# Patient Record
Sex: FEMALE | URBAN - METROPOLITAN AREA
[De-identification: names, ages, dates, MRNs, and addresses within clinical notes are randomized per-mention and may not be internally consistent; named-entity substitution may affect disease eponyms.]

---

## 2017-05-31 ENCOUNTER — HISTORICAL (OUTPATIENT)
Dept: ADMINISTRATIVE | Facility: HOSPITAL | Age: 27
End: 2017-05-31

## 2017-05-31 LAB
ABS NEUT (OLG): 7.17 X10(3)/MCL (ref 2.1–9.2)
BASOPHILS # BLD AUTO: 0.02 X10(3)/MCL
BASOPHILS NFR BLD AUTO: 0 % (ref 0–1)
BILIRUB SERPL-MCNC: NEGATIVE MG/DL
BLOOD URINE, POC: NORMAL
BUN SERPL-MCNC: 6 MG/DL (ref 7–25)
CALCIUM SERPL-MCNC: 9.3 MG/DL (ref 8.4–10.3)
CHLORIDE SERPL-SCNC: 101 MMOL/L (ref 96–110)
CLARITY, POC UA: NORMAL
CO2 SERPL-SCNC: 23 MMOL/L (ref 24–32)
COLOR, POC UA: YELLOW
CREAT SERPL-MCNC: 0.4 MG/DL (ref 0.7–1.1)
EOSINOPHIL # BLD AUTO: 0.02 10*3/UL
EOSINOPHIL NFR BLD AUTO: 0 % (ref 0–5)
ERYTHROCYTE [DISTWIDTH] IN BLOOD BY AUTOMATED COUNT: 12.4 % (ref 11.5–14.5)
GLUCOSE SERPL-MCNC: 87 MG/DL (ref 65–99)
GLUCOSE UR QL STRIP: NEGATIVE
HBV SURFACE AG SERPL QL IA: NEGATIVE
HCT VFR BLD AUTO: 35.7 % (ref 35–46)
HCV AB SERPL QL IA: NONREACTIVE
HGB BLD-MCNC: 12.4 GM/DL (ref 12–16)
HIV 1+2 AB+HIV1 P24 AG SERPL QL IA: NONREACTIVE
IMM GRANULOCYTES # BLD AUTO: 0.05 10*3/UL
IMM GRANULOCYTES NFR BLD AUTO: 0 %
KETONES UR QL STRIP: NEGATIVE
LEUKOCYTE EST, POC UA: NORMAL
LYMPHOCYTES # BLD AUTO: 2.48 X10(3)/MCL
LYMPHOCYTES NFR BLD AUTO: 24 % (ref 15–40)
MCH RBC QN AUTO: 31.6 PG (ref 26–34)
MCHC RBC AUTO-ENTMCNC: 34.7 GM/DL (ref 31–37)
MCV RBC AUTO: 90.8 FL (ref 80–100)
MONOCYTES # BLD AUTO: 0.51 X10(3)/MCL
MONOCYTES NFR BLD AUTO: 5 % (ref 4–12)
NEUTROPHILS # BLD AUTO: 7.17 X10(3)/MCL
NEUTROPHILS NFR BLD AUTO: 70 X10(3)/MCL
NITRITE, POC UA: NEGATIVE
PH, POC UA: 6
PLATELET # BLD AUTO: 280 X10(3)/MCL (ref 130–400)
PMV BLD AUTO: 10.8 FL (ref 7.4–10.4)
POTASSIUM SERPL-SCNC: 3.8 MMOL/L (ref 3.6–5.2)
PROTEIN, POC: NORMAL
RBC # BLD AUTO: 3.93 X10(6)/MCL (ref 4–5.2)
RPR SER QL: NORMAL
SODIUM SERPL-SCNC: 134 MMOL/L (ref 135–146)
SPECIFIC GRAVITY, POC UA: 1.02
UROBILINOGEN, POC UA: NORMAL
WBC # SPEC AUTO: 10.2 X10(3)/MCL (ref 4.5–11)

## 2017-06-02 LAB — FINAL CULTURE: NORMAL

## 2022-04-10 ENCOUNTER — HISTORICAL (OUTPATIENT)
Dept: ADMINISTRATIVE | Facility: HOSPITAL | Age: 32
End: 2022-04-10

## 2022-04-27 VITALS
SYSTOLIC BLOOD PRESSURE: 116 MMHG | OXYGEN SATURATION: 99 % | BODY MASS INDEX: 33.01 KG/M2 | WEIGHT: 186.31 LBS | HEIGHT: 63 IN | DIASTOLIC BLOOD PRESSURE: 75 MMHG

## 2022-04-30 NOTE — PROGRESS NOTES
Patient:   Kaylin Larios             MRN: [id number]            FIN: [id number]               Age:   26 years     Sex:  Female     :  1990   Associated Diagnoses:   None   Author:   Marito Nicholson MD      Physician: WIN  Reason for Exam: INITIAL PRENATAL VISIT; DATING ULTRASOUND  : 1  Parity: 0  LMP: 2017  Gestational Age: 17w6d  EDC: 2017    Examination:  TERESA: ADEQUATE  Fetal Tone: PRESENT  Fetal Movement: PRESENT  Fetal Heart Rate: 146  Fetus: SINGLE  Presentation: TRANSVERSE  Placenta:       Position: POSTERIOR; FUNDAL      stGstrstastdstest:st st1st Measurement:  BPD: 2.90cm  [15w2d]  HC: 10.60cm  [15w0d]  AC: 8.82cn   [15w0d]  FL: 1.78cm   [15w2d]    EGA: 15w1d  EDC: 2017    Comments: 2nd trimester intrauterine pregnancy measuring 15w1d for EDC of 2017 which differs from menstrual date of 2017 by two weeks. FHR: 146. Viable intrauterine pregnancy.  ASSIGN EDC: 2017

## 2022-04-30 NOTE — PROGRESS NOTES
Patient:   Kaylin Larios             MRN: [id number]            FIN: [id number]               Age:   26 years     Sex:  Female     :  1990   Associated Diagnoses:   None   Author:   Tomeka Simms MD      History of Present Illness   26 year old  female @ 15w1d EGA with EDC of 17 and confirmed by 2nd trimester ultrasound presents to [facility] [place] for routine prenatal visit.    Chronic Issue and Treatment: none    Acute complaints: no complaints, compliant with PNVs    OB Review of Systems  Fetal movements: sometimes  Vaginal bleeding: denies  Vaginal discharge: denies  Loss of fluid: denies  Contractions: denies  Headaches: reports, resolve with tylenol  Vision changes: denies  Edema: denies    Pregnancy History:   - G1: current  GYN History: 13yrs at menarche, regular cycles [id number]; LMP 2016, no h/o abnormal Paps, no h/o STDs  Medical History: reviewed  Surgical History: reviewed  Social History: Denies tobacco/drugs/alcohol  Medications: PNVs         Review of Systems   Constitutional:  Negative, No fever, No chills, No fatigue, No night sweats.    Ear/Nose/Mouth/Throat:  Negative, No nasal congestion, No sore throat.    Respiratory:  Negative, No shortness of breath, No cough, No wheezing.    Cardiovascular:  Negative, No chest pain.    Gastrointestinal:  Negative, No nausea, No vomiting, No diarrhea, No constipation.    Genitourinary:  Negative.    Musculoskeletal:  Negative, No joint pain, No muscle pain.    Integumentary:  Negative, No rash.    Neurologic:  Negative.    Psychiatric:  Negative.       Health Status   Allergies:    Allergic Reactions (Selected)  Severity Not Documented  No Known Allergies- No reactions were documented.,    Allergies (1) Active Reaction  No Known Allergies None Documented     Current medications:    No qualifying data available     Problem list:    No qualifying data available        Histories   Past Medical History:    No active or resolved past medical history  items have been selected or recorded.   Family History:    No family history items have been selected or recorded.   Procedure history:    No active procedure history items have been selected or recorded.   Social History        Social & Psychosocial Habits    No Data Available  .        Physical Examination   Vital Signs   5/31/2017 8:24 CDT       Temperature Oral          36.8 DegC                             Peripheral Pulse Rate     57 bpm  LOW                             Respiratory Rate          18 br/min                             SpO2                      99 %                             Systolic Blood Pressure   116 mmHg                             Diastolic Blood Pressure  75 mmHg     General:  Alert and oriented, No acute distress.    Respiratory:  Lungs are clear to auscultation, Respirations are non-labored, Breath sounds are equal, Symmetrical chest wall expansion.    Cardiovascular:  Normal rate, Regular rhythm, No murmur, Good pulses equal in all extremities, No edema.    Gastrointestinal:  Soft, Non-tender, Non-distended, Normal bowel sounds, gravid.    Obstetric Exam     Shahid/ Baby A fetal evaluation: heart tones (within normal limits (110 to 160 bpm), evaluated on US ).     normal appearing external genitalia with no visible lesions; moderate thin white vaginal discharge; speculum exam: cervix clearly visualized, appears smooth with no visible lesions; vaginal mucosa pink, no lacerations; bimanual exam: adnexa nontender without evidence of masses.     Integumentary:  Warm, Dry.    Neurologic:  Alert, Oriented, No focal deficits.    Cognition and Speech:  Oriented.    Psychiatric:  Cooperative, Appropriate mood & affect, Normal judgment.       Health Maintenance      Health Maintenance     Pending (in the next year)        Due            Alcohol Misuse Screening due  05/31/17  and every 1  year(s)           Body Mass Index Check due  05/31/17  and every 1  year(s)           Cervical Cancer  Screening due  05/31/17  Variable frequency           Depression Screening due  05/31/17  and every 1  year(s)           HIV Screening due  05/31/17  and every 1  year(s)           Obesity Screening due  05/31/17  and every 1  year(s)           Tetanus Vaccine due  05/31/17  and every 10  year(s)        Due In Future            Influenza Vaccine not due until  10/02/17  and every 1  year(s)           Blood Pressure Screening not due until  04/10/18  and every 1  year(s)     Satisfied (in the past 1 year)        Satisfied            Blood Pressure Screening on  04/10/17.  Satisfied by Aron MOCK, Karine PALACIOS          Review / Management     Ultrasound   1st Trimester US Date/Location: 5/31/17   Impression: 2nd trimester intrauterine pregnancy measuring 15w1d for EDC of 11/21/2017 which differs from menstrual date of 11/02/2017 by two weeks. FHR: 146. Viable intrauterine pregnancy.  ASSIGN EDC: 11/21/2017     US for Fetal Anomalies Date/Location: _    _ [facility] at time of US   Impression: _    Inital OB Labs (ordered today)  Blood Type and Rh: _  Antibody Screen: _  CBC H/H: _  HIV: _  RPR: _  GC: _  CT: _  HBsAg: _  Rubella: _  Varicella: _  UA & Culture: _  HCVAb: _  Sickle Cell Screen: _  PAP: _  Influenza vaccine date: _  15-20 Weeks Lab  Quad Screen: _  28 Week Lab  1H GTT: _  Date of Rhogam if indicated: _  Date of Tdap: _  36 Week Lab  GBS Culture: _        Results review:  All Results   5/31/2017 8:42 CDT       Urine Color Urine Dipstick                Yellow                             Urine Appearance Urine Dipstick           Slightly cloudy                             pH Urine Dipstick         6                             Specific Gravity Urine Dipstick           1.025                             Blood Urine Dipstick      Small                             Glucose Urine Dipstick    Negative                             Ketones Urine Dipstick    Negative                             Protein Urine Dipstick    Trace                              Bilirubin Urine Dipstick  Negative                             Urobilinogen Urine Dipstick               0.2 mg/dl                             Leukocytes Urine Dipstick Small                             Nitrite Urine Dipstick    Negative  .       Impression and Plan   Diagnosis   26 year old  female @ 15w1d EGA:    1. IUP   - continue PNV   - routine lab orders    - urine dip above, trace protein   - plans to try breast feeding     - discussed postpartum birthcontrol, patient will consider her options   - ER/WIC precautions    Assign to Resident, Return to Clinic 4 Weeks

## 2025-05-21 ENCOUNTER — HOSPITAL ENCOUNTER (EMERGENCY)
Facility: HOSPITAL | Age: 35
Discharge: ANOTHER HOSPITAL | End: 2025-05-21
Attending: EMERGENCY MEDICINE

## 2025-05-21 ENCOUNTER — HOSPITAL ENCOUNTER (INPATIENT)
Facility: HOSPITAL | Age: 35
LOS: 2 days | Discharge: HOME | DRG: 101 | End: 2025-05-23
Attending: EMERGENCY MEDICINE | Admitting: STUDENT IN AN ORGANIZED HEALTH CARE EDUCATION/TRAINING PROGRAM

## 2025-05-21 VITALS
OXYGEN SATURATION: 99 % | WEIGHT: 183 LBS | HEART RATE: 82 BPM | TEMPERATURE: 98 F | DIASTOLIC BLOOD PRESSURE: 80 MMHG | RESPIRATION RATE: 20 BRPM | BODY MASS INDEX: 32.43 KG/M2 | SYSTOLIC BLOOD PRESSURE: 118 MMHG | HEIGHT: 63 IN

## 2025-05-21 DIAGNOSIS — R41.82 ALTERED MENTAL STATE: ICD-10-CM

## 2025-05-21 DIAGNOSIS — R51.9 ACUTE NONINTRACTABLE HEADACHE, UNSPECIFIED HEADACHE TYPE: ICD-10-CM

## 2025-05-21 DIAGNOSIS — R56.9 NEW ONSET SEIZURE: ICD-10-CM

## 2025-05-21 DIAGNOSIS — I49.9 CARDIAC RHYTHM DISORDER OR DISTURBANCE OR CHANGE: ICD-10-CM

## 2025-05-21 DIAGNOSIS — R07.9 CHEST PAIN: ICD-10-CM

## 2025-05-21 LAB
ACCEPTIBLE SP GR UR QL: >=1.03 (ref 1–1.03)
ALBUMIN SERPL-MCNC: 4.1 G/DL (ref 3.5–5)
ALBUMIN/GLOB SERPL: 1 RATIO (ref 1.1–2)
ALP SERPL-CCNC: 77 UNIT/L (ref 40–150)
ALT SERPL-CCNC: 19 UNIT/L (ref 0–55)
AMPHET UR QL SCN: NEGATIVE
ANION GAP SERPL CALC-SCNC: 12 MEQ/L
APPEARANCE CSF: CLEAR
AST SERPL-CCNC: 22 UNIT/L (ref 11–45)
BACTERIA #/AREA URNS AUTO: ABNORMAL /HPF
BARBITURATE SCN PRESENT UR: NEGATIVE
BASOPHILS # BLD AUTO: 0.03 X10(3)/MCL
BASOPHILS NFR BLD AUTO: 0.2 %
BENZODIAZ UR QL SCN: NEGATIVE
BILIRUB SERPL-MCNC: 0.3 MG/DL
BILIRUB UR QL STRIP.AUTO: NEGATIVE
BUN SERPL-MCNC: 14.1 MG/DL (ref 7–18.7)
CALCIUM SERPL-MCNC: 9.8 MG/DL (ref 8.4–10.2)
CANNABINOIDS UR QL SCN: NEGATIVE
CHLORIDE SERPL-SCNC: 102 MMOL/L (ref 98–107)
CLARITY UR: CLEAR
CO2 SERPL-SCNC: 23 MMOL/L (ref 22–29)
COCAINE UR QL SCN: NEGATIVE
COLOR CSF: COLORLESS
COLOR UR AUTO: ABNORMAL
CREAT SERPL-MCNC: 0.66 MG/DL (ref 0.55–1.02)
CREAT/UREA NIT SERPL: 21
EOSINOPHIL # BLD AUTO: 0.07 X10(3)/MCL (ref 0–0.9)
EOSINOPHIL NFR BLD AUTO: 0.5 %
ERYTHROCYTE [DISTWIDTH] IN BLOOD BY AUTOMATED COUNT: 12 % (ref 11.5–17)
FENTANYL UR QL SCN: NEGATIVE
GFR SERPLBLD CREATININE-BSD FMLA CKD-EPI: >60 ML/MIN/1.73/M2
GLOBULIN SER-MCNC: 4.2 GM/DL (ref 2.4–3.5)
GLUCOSE CSF-MCNC: 78 MG/DL (ref 40–70)
GLUCOSE SERPL-MCNC: 121 MG/DL (ref 74–100)
GLUCOSE UR QL STRIP: NEGATIVE
HCT VFR BLD AUTO: 42.6 % (ref 37–47)
HGB BLD-MCNC: 15.2 G/DL (ref 12–16)
HGB UR QL STRIP: ABNORMAL
IMM GRANULOCYTES # BLD AUTO: 0.04 X10(3)/MCL (ref 0–0.04)
IMM GRANULOCYTES NFR BLD AUTO: 0.3 %
KETONES UR QL STRIP: NEGATIVE
LACTATE SERPL-SCNC: 3.9 MMOL/L (ref 0.5–2.2)
LACTATE SERPL-SCNC: 5.3 MMOL/L (ref 0.5–2.2)
LEUKOCYTE ESTERASE UR QL STRIP: NEGATIVE
LYMPHOCYTE MAN % CSF (OLG): 95 %
LYMPHOCYTES # BLD AUTO: 3.92 X10(3)/MCL (ref 0.6–4.6)
LYMPHOCYTES NFR BLD AUTO: 26.2 %
MAGNESIUM SERPL-MCNC: 1.9 MG/DL (ref 1.6–2.6)
MCH RBC QN AUTO: 31.9 PG (ref 27–31)
MCHC RBC AUTO-ENTMCNC: 35.7 G/DL (ref 33–36)
MCV RBC AUTO: 89.3 FL (ref 80–94)
MONOCYTE MAN % CSF (OLG): 4 %
MONOCYTES # BLD AUTO: 0.7 X10(3)/MCL (ref 0.1–1.3)
MONOCYTES NFR BLD AUTO: 4.7 %
NEUTROPHILS # BLD AUTO: 10.21 X10(3)/MCL (ref 2.1–9.2)
NEUTROPHILS MAN % CSF (OLG): 1 %
NEUTROPHILS NFR BLD AUTO: 68.1 %
NITRITE UR QL STRIP: NEGATIVE
OPIATES UR QL SCN: NEGATIVE
PCP UR QL: NEGATIVE
PH UR STRIP: 5.5 [PH]
PH UR: 5.5 [PH] (ref 3–11)
PLATELET # BLD AUTO: 344 X10(3)/MCL (ref 130–400)
PMV BLD AUTO: 10.2 FL (ref 7.4–10.4)
POTASSIUM SERPL-SCNC: 3.5 MMOL/L (ref 3.5–5.1)
PROT CSF-MCNC: 19.6 MG/DL (ref 15–45)
PROT SERPL-MCNC: 8.3 GM/DL (ref 6.4–8.3)
PROT UR QL STRIP: 100
RBC # BLD AUTO: 4.77 X10(6)/MCL (ref 4.2–5.4)
RBC # CSF MANUAL: 0 /UL
RBC #/AREA URNS AUTO: ABNORMAL /HPF
SODIUM SERPL-SCNC: 137 MMOL/L (ref 136–145)
SP GR UR STRIP.AUTO: >=1.03 (ref 1–1.03)
SQUAMOUS #/AREA URNS AUTO: ABNORMAL /HPF
TNC CSF (OLG): 4 /UL
TROPONIN I SERPL-MCNC: <0.01 NG/ML (ref 0–0.04)
TUBE NUMBER CSF (BEAKER): 3
UROBILINOGEN UR STRIP-ACNC: 0.2
WBC # BLD AUTO: 14.97 X10(3)/MCL (ref 4.5–11.5)
WBC #/AREA URNS AUTO: ABNORMAL /HPF

## 2025-05-21 PROCEDURE — 80307 DRUG TEST PRSMV CHEM ANLYZR: CPT | Performed by: EMERGENCY MEDICINE

## 2025-05-21 PROCEDURE — 87798 DETECT AGENT NOS DNA AMP: CPT | Performed by: EMERGENCY MEDICINE

## 2025-05-21 PROCEDURE — 83735 ASSAY OF MAGNESIUM: CPT | Performed by: EMERGENCY MEDICINE

## 2025-05-21 PROCEDURE — 89051 BODY FLUID CELL COUNT: CPT | Performed by: EMERGENCY MEDICINE

## 2025-05-21 PROCEDURE — 11000001 HC ACUTE MED/SURG PRIVATE ROOM

## 2025-05-21 PROCEDURE — 84484 ASSAY OF TROPONIN QUANT: CPT | Performed by: EMERGENCY MEDICINE

## 2025-05-21 PROCEDURE — 96375 TX/PRO/DX INJ NEW DRUG ADDON: CPT

## 2025-05-21 PROCEDURE — 63600175 PHARM REV CODE 636 W HCPCS: Performed by: STUDENT IN AN ORGANIZED HEALTH CARE EDUCATION/TRAINING PROGRAM

## 2025-05-21 PROCEDURE — 63600175 PHARM REV CODE 636 W HCPCS: Performed by: EMERGENCY MEDICINE

## 2025-05-21 PROCEDURE — 96361 HYDRATE IV INFUSION ADD-ON: CPT

## 2025-05-21 PROCEDURE — 96365 THER/PROPH/DIAG IV INF INIT: CPT

## 2025-05-21 PROCEDURE — 25000003 PHARM REV CODE 250: Performed by: STUDENT IN AN ORGANIZED HEALTH CARE EDUCATION/TRAINING PROGRAM

## 2025-05-21 PROCEDURE — 93010 ELECTROCARDIOGRAM REPORT: CPT | Mod: ,,, | Performed by: INTERNAL MEDICINE

## 2025-05-21 PROCEDURE — 83605 ASSAY OF LACTIC ACID: CPT | Performed by: EMERGENCY MEDICINE

## 2025-05-21 PROCEDURE — 80053 COMPREHEN METABOLIC PANEL: CPT | Performed by: EMERGENCY MEDICINE

## 2025-05-21 PROCEDURE — 62270 DX LMBR SPI PNXR: CPT

## 2025-05-21 PROCEDURE — 81003 URINALYSIS AUTO W/O SCOPE: CPT | Performed by: EMERGENCY MEDICINE

## 2025-05-21 PROCEDURE — 87040 BLOOD CULTURE FOR BACTERIA: CPT | Performed by: EMERGENCY MEDICINE

## 2025-05-21 PROCEDURE — 82945 GLUCOSE OTHER FLUID: CPT | Performed by: EMERGENCY MEDICINE

## 2025-05-21 PROCEDURE — 85025 COMPLETE CBC W/AUTO DIFF WBC: CPT | Performed by: EMERGENCY MEDICINE

## 2025-05-21 PROCEDURE — 87070 CULTURE OTHR SPECIMN AEROBIC: CPT | Performed by: EMERGENCY MEDICINE

## 2025-05-21 PROCEDURE — 21400001 HC TELEMETRY ROOM

## 2025-05-21 PROCEDURE — 84157 ASSAY OF PROTEIN OTHER: CPT | Performed by: EMERGENCY MEDICINE

## 2025-05-21 PROCEDURE — 93005 ELECTROCARDIOGRAM TRACING: CPT

## 2025-05-21 PROCEDURE — 25000003 PHARM REV CODE 250: Performed by: EMERGENCY MEDICINE

## 2025-05-21 PROCEDURE — 99285 EMERGENCY DEPT VISIT HI MDM: CPT | Mod: 25,27

## 2025-05-21 RX ORDER — SODIUM CHLORIDE 9 MG/ML
INJECTION, SOLUTION INTRAVENOUS CONTINUOUS
Status: DISCONTINUED | OUTPATIENT
Start: 2025-05-21 | End: 2025-05-22

## 2025-05-21 RX ORDER — ONDANSETRON HYDROCHLORIDE 2 MG/ML
4 INJECTION, SOLUTION INTRAVENOUS EVERY 8 HOURS PRN
Status: DISCONTINUED | OUTPATIENT
Start: 2025-05-21 | End: 2025-05-23 | Stop reason: HOSPADM

## 2025-05-21 RX ORDER — MORPHINE SULFATE 4 MG/ML
2 INJECTION, SOLUTION INTRAMUSCULAR; INTRAVENOUS EVERY 6 HOURS PRN
Refills: 0 | Status: DISCONTINUED | OUTPATIENT
Start: 2025-05-21 | End: 2025-05-23 | Stop reason: HOSPADM

## 2025-05-21 RX ORDER — LIDOCAINE HYDROCHLORIDE 10 MG/ML
10 INJECTION, SOLUTION INFILTRATION; PERINEURAL
Status: COMPLETED | OUTPATIENT
Start: 2025-05-21 | End: 2025-05-21

## 2025-05-21 RX ORDER — TALC
9 POWDER (GRAM) TOPICAL NIGHTLY PRN
Status: DISCONTINUED | OUTPATIENT
Start: 2025-05-21 | End: 2025-05-23 | Stop reason: HOSPADM

## 2025-05-21 RX ORDER — ONDANSETRON 4 MG/1
8 TABLET, ORALLY DISINTEGRATING ORAL EVERY 8 HOURS PRN
Status: DISCONTINUED | OUTPATIENT
Start: 2025-05-21 | End: 2025-05-23 | Stop reason: HOSPADM

## 2025-05-21 RX ORDER — SIMETHICONE 80 MG
1 TABLET,CHEWABLE ORAL 4 TIMES DAILY PRN
Status: DISCONTINUED | OUTPATIENT
Start: 2025-05-21 | End: 2025-05-23 | Stop reason: HOSPADM

## 2025-05-21 RX ORDER — LEVETIRACETAM 10 MG/ML
1000 INJECTION INTRAVASCULAR
Status: COMPLETED | OUTPATIENT
Start: 2025-05-21 | End: 2025-05-21

## 2025-05-21 RX ORDER — GLUCAGON 1 MG
1 KIT INJECTION
Status: DISCONTINUED | OUTPATIENT
Start: 2025-05-21 | End: 2025-05-23 | Stop reason: HOSPADM

## 2025-05-21 RX ORDER — HYDROCODONE BITARTRATE AND ACETAMINOPHEN 5; 325 MG/1; MG/1
1 TABLET ORAL EVERY 6 HOURS PRN
Refills: 0 | Status: DISCONTINUED | OUTPATIENT
Start: 2025-05-21 | End: 2025-05-23 | Stop reason: HOSPADM

## 2025-05-21 RX ORDER — SODIUM CHLORIDE 0.9 % (FLUSH) 0.9 %
10 SYRINGE (ML) INJECTION
Status: DISCONTINUED | OUTPATIENT
Start: 2025-05-21 | End: 2025-05-23 | Stop reason: HOSPADM

## 2025-05-21 RX ORDER — IBUPROFEN 200 MG
24 TABLET ORAL
Status: DISCONTINUED | OUTPATIENT
Start: 2025-05-21 | End: 2025-05-23 | Stop reason: HOSPADM

## 2025-05-21 RX ORDER — POLYETHYLENE GLYCOL 3350 17 G/17G
17 POWDER, FOR SOLUTION ORAL 3 TIMES DAILY PRN
Status: DISCONTINUED | OUTPATIENT
Start: 2025-05-21 | End: 2025-05-23 | Stop reason: HOSPADM

## 2025-05-21 RX ORDER — CEFTRIAXONE 2 G/1
2 INJECTION, POWDER, FOR SOLUTION INTRAMUSCULAR; INTRAVENOUS
Status: COMPLETED | OUTPATIENT
Start: 2025-05-21 | End: 2025-05-21

## 2025-05-21 RX ORDER — IBUPROFEN 200 MG
16 TABLET ORAL
Status: DISCONTINUED | OUTPATIENT
Start: 2025-05-21 | End: 2025-05-23 | Stop reason: HOSPADM

## 2025-05-21 RX ORDER — LORAZEPAM 2 MG/ML
1 INJECTION INTRAMUSCULAR
Status: COMPLETED | OUTPATIENT
Start: 2025-05-21 | End: 2025-05-21

## 2025-05-21 RX ORDER — LORAZEPAM 2 MG/ML
2 INJECTION INTRAMUSCULAR
Status: DISCONTINUED | OUTPATIENT
Start: 2025-05-21 | End: 2025-05-23 | Stop reason: HOSPADM

## 2025-05-21 RX ORDER — ACETAMINOPHEN 325 MG/1
650 TABLET ORAL EVERY 4 HOURS PRN
Status: DISCONTINUED | OUTPATIENT
Start: 2025-05-21 | End: 2025-05-23 | Stop reason: HOSPADM

## 2025-05-21 RX ORDER — ONDANSETRON HYDROCHLORIDE 2 MG/ML
4 INJECTION, SOLUTION INTRAVENOUS
Status: COMPLETED | OUTPATIENT
Start: 2025-05-21 | End: 2025-05-21

## 2025-05-21 RX ORDER — ENOXAPARIN SODIUM 100 MG/ML
40 INJECTION SUBCUTANEOUS EVERY 24 HOURS
Status: DISCONTINUED | OUTPATIENT
Start: 2025-05-21 | End: 2025-05-23 | Stop reason: HOSPADM

## 2025-05-21 RX ORDER — BISACODYL 10 MG/1
10 SUPPOSITORY RECTAL DAILY PRN
Status: DISCONTINUED | OUTPATIENT
Start: 2025-05-21 | End: 2025-05-23 | Stop reason: HOSPADM

## 2025-05-21 RX ORDER — IPRATROPIUM BROMIDE AND ALBUTEROL SULFATE 2.5; .5 MG/3ML; MG/3ML
3 SOLUTION RESPIRATORY (INHALATION) EVERY 6 HOURS PRN
Status: DISCONTINUED | OUTPATIENT
Start: 2025-05-21 | End: 2025-05-23 | Stop reason: HOSPADM

## 2025-05-21 RX ORDER — DEXAMETHASONE SODIUM PHOSPHATE 4 MG/ML
12 INJECTION, SOLUTION INTRA-ARTICULAR; INTRALESIONAL; INTRAMUSCULAR; INTRAVENOUS; SOFT TISSUE
Status: COMPLETED | OUTPATIENT
Start: 2025-05-21 | End: 2025-05-21

## 2025-05-21 RX ORDER — NALOXONE HCL 0.4 MG/ML
0.02 VIAL (ML) INJECTION
Status: DISCONTINUED | OUTPATIENT
Start: 2025-05-21 | End: 2025-05-23 | Stop reason: HOSPADM

## 2025-05-21 RX ORDER — ALUMINUM HYDROXIDE, MAGNESIUM HYDROXIDE, AND SIMETHICONE 1200; 120; 1200 MG/30ML; MG/30ML; MG/30ML
30 SUSPENSION ORAL 4 TIMES DAILY PRN
Status: DISCONTINUED | OUTPATIENT
Start: 2025-05-21 | End: 2025-05-23 | Stop reason: HOSPADM

## 2025-05-21 RX ADMIN — ONDANSETRON 4 MG: 2 INJECTION INTRAMUSCULAR; INTRAVENOUS at 02:05

## 2025-05-21 RX ADMIN — HYDROCODONE BITARTRATE AND ACETAMINOPHEN 1 TABLET: 5; 325 TABLET ORAL at 11:05

## 2025-05-21 RX ADMIN — SODIUM CHLORIDE: 9 INJECTION, SOLUTION INTRAVENOUS at 10:05

## 2025-05-21 RX ADMIN — DEXAMETHASONE SODIUM PHOSPHATE 12 MG: 4 INJECTION, SOLUTION INTRA-ARTICULAR; INTRALESIONAL; INTRAMUSCULAR; INTRAVENOUS; SOFT TISSUE at 03:05

## 2025-05-21 RX ADMIN — VANCOMYCIN HYDROCHLORIDE 2000 MG: 1 INJECTION, POWDER, LYOPHILIZED, FOR SOLUTION INTRAVENOUS at 03:05

## 2025-05-21 RX ADMIN — ENOXAPARIN SODIUM 40 MG: 40 INJECTION SUBCUTANEOUS at 10:05

## 2025-05-21 RX ADMIN — CEFTRIAXONE SODIUM 2 G: 2 INJECTION, POWDER, FOR SOLUTION INTRAMUSCULAR; INTRAVENOUS at 02:05

## 2025-05-21 RX ADMIN — LORAZEPAM 1 MG: 2 INJECTION INTRAMUSCULAR; INTRAVENOUS at 02:05

## 2025-05-21 RX ADMIN — LEVETIRACETAM INJECTION 1000 MG: 10 INJECTION INTRAVENOUS at 09:05

## 2025-05-21 RX ADMIN — SODIUM CHLORIDE 1000 ML: 9 INJECTION, SOLUTION INTRAVENOUS at 02:05

## 2025-05-21 RX ADMIN — SODIUM CHLORIDE 1000 ML: 9 INJECTION, SOLUTION INTRAVENOUS at 10:05

## 2025-05-21 RX ADMIN — LIDOCAINE HYDROCHLORIDE 5 ML: 10 INJECTION, SOLUTION INFILTRATION; PERINEURAL at 03:05

## 2025-05-21 NOTE — ED NOTES
Acyclovir has to be made at [facility] and sent here.  Per transfer center, justin will be here within an hour.  Pharmacist unsure how long it will take to get here.  Dr. Lozano notified, okay to have pharmacy to keep there and give it when they get to the er.  Mikayla was notified in the ER there.

## 2025-05-21 NOTE — ED PROVIDER NOTES
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 The patient, a [place] national on a work assignment, experienced two distinct episodes of neurological dysfunction.    The initial episode occurred spontaneously while working, characterized by sudden loss of consciousness. According to her , the patient exhibited characteristic seizure  activity including ocular deviation and muscular rigidity. The episode lasted approximately two minutes, accompanied by disoriented verbalization.    Following a brief period of lucidity, a second more pronounced episode emerged  in occurred during transport to the hospital while on the ambulance, manifesting increased agitation and reduced responsiveness. The patient demonstrated heightened muscular tension and limited command comprehension. This episode was also less than 5 minutes duration   And was witnessed by 1st responders    The patient reported an antecedent headache of unusual presentation, which began the previous morning. She initially attributed the symptoms to potential cardiovascular  assuming her blood pressure was elevated or psychological etiology  considering that she was anxious or working too hard, but remained functional until the neurological event. No recent traumatic history or surgical interventions were reported. A significant language barrier exists, necessitating  services  for comprehensive communication.      upon arrival to the emergency room she is alert spontaneously with a GCS of 14 with 1 off for verbal response.  She does move all extremities and follows commands.  We have not witnessed any seizure-like activity in the emergency room.  She does complain of a severe headache describing as the worst of her life.      Review of patient's allergies indicates:  No Known Allergies  No past medical history on file.  No past surgical history on file.  No family history on file.  Social History[1]  Review of Systems   Constitutional:  Negative for chills and fever.   HENT: Negative.  Negative for congestion, sore throat and trouble swallowing.    Eyes:  Negative for discharge and visual disturbance.   Respiratory:  Negative for cough and shortness of breath.    Cardiovascular:  Positive for palpitations. Negative for chest pain.   Gastrointestinal:  Negative for abdominal pain, diarrhea and vomiting.   Endocrine: Negative.    Genitourinary:  Negative for flank pain and hematuria.   Musculoskeletal:  Negative for myalgias.   Skin:  Negative for rash.   Neurological:  Positive for seizures, speech difficulty and headaches. Negative for dizziness.   Psychiatric/Behavioral:  Positive for agitation and confusion. Negative for hallucinations and suicidal ideas.    All other systems reviewed and are negative.      Physical Exam     Initial Vitals [05/21/25 1341]   BP Pulse Resp Temp SpO2   125/73 (!) 130 20 97.9 °F (36.6 °C) 96 %      MAP       --         Physical Exam    Nursing note and vitals reviewed.  Constitutional: She appears well-developed and well-nourished. She is diaphoretic. She appears ill. She appears distressed.   HENT:   Head: Normocephalic and atraumatic.   Eyes: EOM are normal. Pupils are equal, round, and reactive to light.   Neck: Trachea normal. Neck supple.    Full passive range of motion without pain.     Cardiovascular:  Normal rate, regular rhythm and normal pulses.    "        Pulmonary/Chest: Breath sounds normal.   Abdominal: Abdomen is soft. Bowel sounds are normal.   Musculoskeletal:      Cervical back: Full passive range of motion without pain and neck supple.      Comments: No deformity, Nl ROM     Lymphadenopathy:     She has no cervical adenopathy.   Neurological: She is alert and oriented to person, place, and time. She has normal strength. GCS eye subscore is 4. GCS verbal subscore is 5. GCS motor subscore is 6.   Skin: Skin is warm and intact. Capillary refill takes less than 2 seconds.   Psychiatric: She is not actively hallucinating. She expresses no homicidal and no suicidal ideation.         ED Course   Lumbar Puncture    Date/Time: 5/21/2025 3:00 PM  Location procedure was performed: [facility] EMERGENCY DEPARTMENT    Performed by: Dominguez Lozano MD  Authorized by: Dominguez Lozano MD  Comments: Performed by:  Dr. Lozano  Consent: Written consent obtained.  Risks and benefits: risks, benefits and alternatives were discussed.  The procedure was explained to the patient/surrogate with an opportunity to ask questions, and the risks, benefits, and alternatives were discussed.  The potential complications, including post-LP headache, localized pain (during, and afterwards), infection (epidural abscess, discitis, etc), and bleeding (hematoma) were also discussed.  Consent given by: patient or patient representative/guardian  Patient understanding: patient states understanding of the procedure being performed  Patient consent: the patient's understanding of the procedure matches consent given  Procedure consent: procedure consent matches procedure scheduled  Site marked: the insertion site was marked  Patient identity confirmed: arm band and verbally with patient  Time out: Immediately prior to procedure a "time out" was called to verify the correct patient, procedure, equipment, support staff and site/side marked as required.  Anesthesia: local infiltration with lidocaine 1% " without epinephrine after skin prep with betadine  Anesthetic total: 4 ml  Patient sedated: no  Preparation: Patient was prepped and draped in the usual sterile fashion.  Lumbar space: L3 - L4 interspace  Patient's position: Upright (resting on tray)  Needle gauge: 22g, 3.5inch needle  Number of attempts: 2, atraumatic  Fluid appearance: clear  Tubes of fluid: 4  Total volume: 4 ml  Post-procedure: site cleaned and pressure dressing applied  Patient tolerance: Patient tolerated the procedure well with no immediate complications.            Critical Care    Date/Time: 5/21/2025 4:35 PM    Performed by: Dominguez Lozano MD  Authorized by: Dominguez Lozano MD  Total critical care time (exclusive of procedural time) : 0 minutes  Comments: Total critical care time is 75 minutes.      Total critical care time documented does not include time spent on separately billed procedures or the services of nurses, or mid-level providers.      I personally saw and examined the patient. I have reviewed all diagnostic interpretations and treatment plans as written. I was present for the key portions of any procedures performed and the inclusive time noted in any critical care statement. Critical care time includes patient management by me, time spent at the patient's bedside, time to review lab and imaging results, discussing patient care, documentation in the medical record, and time spent with the family or caregiver.     The high probability of sudden, clinically significant deterioration in the patient's condition required the highest level of preparedness to intervene urgently.          Labs Reviewed   COMPREHENSIVE METABOLIC PANEL - Abnormal       Result Value    Sodium 137      Potassium 3.5      Chloride 102      CO2 23      Glucose 121 (*)     Blood Urea Nitrogen 14.1      Creatinine 0.66      Calcium 9.8      Protein Total 8.3      Albumin 4.1      Globulin 4.2 (*)     Albumin/Globulin Ratio 1.0 (*)     Bilirubin Total 0.3       ALP 77      ALT 19      AST 22      eGFR >60      Anion Gap 12.0      BUN/Creatinine Ratio 21     LACTIC ACID, PLASMA - Abnormal    Lactic Acid Level 5.3 (*)    URINALYSIS, REFLEX TO URINE CULTURE - Abnormal    Color, UA Straw      Appearance, UA Clear      Specific Gravity, UA >=1.030      pH, UA 5.5      Protein,  (*)     Glucose, UA Negative      Ketones, UA Negative      Blood, UA Moderate (*)     Bilirubin, UA Negative      Urobilinogen, UA 0.2      Nitrites, UA Negative      Leukocyte Esterase, UA Negative     CBC WITH DIFFERENTIAL - Abnormal    WBC 14.97 (*)     RBC 4.77      Hgb 15.2      Hct 42.6      MCV 89.3      MCH 31.9 (*)     MCHC 35.7      RDW 12.0      Platelet 344      MPV 10.2      Neut % 68.1      Lymph % 26.2      Mono % 4.7      Eos % 0.5      Basophil % 0.2      Imm Grans % 0.3      Neut # 10.21 (*)     Lymph # 3.92      Mono # 0.70      Eos # 0.07      Baso # 0.03      Imm Gran # 0.04     LACTIC ACID, PLASMA - Abnormal    Lactic Acid Level 3.9 (*)    GLUCOSE, CSF - Abnormal    Glucose CSF  78 (*)    URINALYSIS, MICROSCOPIC - Abnormal    Bacteria, UA Few (*)     RBC, UA None Seen      WBC, UA 0-5      Squamous Epithelial Cells, UA Occasional     DRUG SCREEN, URINE (BEAKER) - Normal    Amphetamines, Urine Negative      Barbiturates, Urine Negative      Benzodiazepine, Urine Negative      Cannabinoids, Urine Negative      Cocaine, Urine Negative      Opiates, Urine Negative      Phencyclidine, Urine Negative      Fentanyl, Urine Negative      pH, Urine 5.5      Specific Gravity, Urine Auto >=1.030      Narrative:     Cut off concentrations:    Amphetamines - 1000 ng/ml  Barbiturates - 200 ng/ml  Benzodiazepine - 200 ng/ml  Cannabinoids (THC) - 50 ng/ml  Cocaine - 300 ng/ml  Fentanyl - 1.0 ng/ml  MDMA - 500 ng/ml  Opiates - 300 ng/ml   Phencyclidine (PCP) - 25 ng/ml    Specimen submitted for drug analysis and tested for pH and specific gravity in order to evaluate sample integrity.  Suspect tampering if specific gravity is <1.003 and/or pH is not within the range of 4.5 - 8.0  False negatives may result form substances such as bleach added to urine.  False positives may result for the presence of a substance with similar chemical structure to the drug or its metabolite.    This test provides only a PRELIMINARY analytical test result. A more specific alternate chemical method must be used in order to obtain a confirmed analytical result. Gas chromatography/mass spectrometry (GC/MS) is the preferred confirmatory method. Other chemical confirmation methods are available. Clinical consideration and professional judgement should be applied to any drug of abuse test result, particularly when preliminary positive results are used.    Positive results will be confirmed only at the physicians request. Unconfirmed screening results are to be used only for medical purposes (treatment).        MAGNESIUM - Normal    Magnesium Level 1.90     TROPONIN I - Normal    Troponin-I <0.010     PROTEIN, CSF - Normal    Protein CSF  19.6     BLOOD CULTURE [facility]   BLOOD CULTURE [facility]   BODY FLUID CULTURE [facility]   CBC W/ AUTO DIFFERENTIAL    Narrative:     The following orders were created for panel order CBC auto differential.  Procedure                               Abnormality         Status                     ---------                               -----------         ------                     CBC with Differential[[id number]]       Abnormal            Final result                 Please view results for these tests on the individual orders.   CELL COUNT, CSF    Tube # CSF 3      Color CSF Colorless      Appear CSF Clear      Total Nucleated Cells CSF 4      RBC CSF 0     FREEZE AND HOLD -    WEST NILE VIRUS, PCR, CSF   CELL COUNT W/ DIFF, CSF    Narrative:     The following orders were created for panel order Cell Count w/ Diff, CSF.  Procedure                               Abnormality         Status                      ---------                               -----------         ------                     Cell Count, CSF[[id number]]                                 Final result               Differential, CSF[[id number]]                               In process                   Please view results for these tests on the individual orders.   DIFFERENTIAL, CSF     EKG Readings: (Independently Interpreted)   Rhythm: Sinus Tachycardia. Ectopy: No Ectopy. Conduction: Normal. ST Segments: Normal ST Segments. T Waves: Normal. Clinical Impression: Normal Sinus Rhythm   Rate 103       Imaging Results              CT Head Without Contrast (Final result)  Result time 05/21/25 13:59:17      Final result by Ash Andre MD (05/21/25 13:59:17)                   Impression:      No acute intracranial findings.      Electronically signed by: Ash Andre  Date:    05/21/2025  Time:    13:59               Narrative:    EXAMINATION:  CT HEAD WITHOUT CONTRAST    CLINICAL HISTORY:  Headache, sudden, severe;    TECHNIQUE:  CT imaging of the head performed from the skull base to the vertex without intravenous contrast.  DLP 1105 mGycm. Automatic exposure control, adjustment of mA/kV or iterative reconstruction technique was used to reduce radiation.    COMPARISON:  None Available.    FINDINGS:  There is no acute cortical infarct, hemorrhage or mass lesion.  The ventricles are normal in size.    Visualized paranasal sinuses and mastoid air cells are clear.                                       Medications   acyclovir 700 mg in 0.9% NaCl 100 mL IVPB (has no administration in time range)   sodium chloride 0.9% bolus 1,000 mL 1,000 mL (0 mLs Intravenous Stopped 5/21/25 1503)   ondansetron injection 4 mg (4 mg Intravenous Given 5/21/25 1404)   LORazepam injection 1 mg (1 mg Intravenous Given 5/21/25 1405)   cefTRIAXone injection 2 g (2 g Intravenous Given 5/21/25 1454)   vancomycin (VANCOCIN) 2,000 mg in 0.9% NaCl 500 mL IVPB (0 mg Intravenous Stopped  5/21/25 1719)   dexAMETHasone injection 12 mg (12 mg Intravenous Given 5/21/25 1500)   LIDOcaine HCL 10 mg/ml (1%) injection 10 mL (5 mLs Infiltration Given by Provider 5/21/25 1500)     Medical Decision Making  Medical Decision Making  Comprehensive neurological evaluation of a 34-year-old female presenting with acute mental status alteration and paroxysmal neurological events. The clinical presentation mandates an urgent and targeted diagnostic approach with subarachnoid hemorrhage as the primary concern.    Neurological Assessment: The patient's acute-onset altered mental status, characterized by two distinct neurological episodes involving loss of consciousness, muscular rigidity, and transient cognitive disruption, requires immediate and focused investigation.    Detailed Differential Diagnosis:  - Subarachnoid hemorrhage is the primary diagnostic consideration, given the patient's report of the 'worst headache of her life' combined with new-onset seizure activity. Rapid radiographic and clinical exclusion of this potentially life-threatening condition is paramount.  - If subarachnoid hemorrhage is ruled out, a comprehensive evaluation for central nervous system infection becomes the next critical diagnostic pathway, necessitating a lumbar puncture to definitively exclude infectious etiology.  - Complex partial seizure disorder with atypical presentation remains a potential underlying mechanism.  - Metabolic encephalopathy potentially related to an underlying systemic condition could explain the patient's neurological symptoms.  - Toxic or environmental exposure with neurological sequelae will be considered in the diagnostic workup.    Comprehensive Management Strategy:  - Advanced neurological imaging with CT head   - Multi-panel extensive laboratory diagnostics   - Lumbar puncture to rule out CNS infection if subarachnoid hemorrhage is excluded  - Continuous cardiac and neurological monitoring with frequent  reassessment  - Detailed neurological status surveillance with precise documentation of progression or resolution of symptoms    Prognostic Considerations:  Close monitoring and a systematic, evidence-based approach will be critical in determining the underlying etiology and developing a targeted therapeutic intervention.    Amount and/or Complexity of Data Reviewed  Labs: ordered.  Radiology: ordered.    Risk  Prescription drug management.                     4:00 p.m.  Seizure with Suspected CNS Infection  Assessment: Patient presented with seizure activity and is now experiencing moderate head pain. CT scan was normal with no evidence of intracranial hemorrhage. No additional seizure activity has been observed since initial presentation. Laboratory results show an elevated white blood cell count over 15,000 and elevated lactic acid. Given these findings, CNS infection is the top differential diagnosis and requires immediate attention. Her condition remains critical until a definitive diagnosis is established.    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    Plan:  - Administer Decadron 12 mg IV (0.15 mg/kg dosing)  - Administer vancomycin 2 g IV loading dose  - Administer Rocephin 2 g IV  - Obtain blood cultures  - Perform lumbar puncture at bedside  - Transfer patient after successful completion of lumbar puncture  - Obtain neurological evaluation  - Consider workplace exposure investigation  - Closely monitor vital signs and neurological status  - Prepare for potential intensive care management if bacterial meningitis should be established           Patient was accepted to [facility] back to [place].  The acyclovir ordered has to be mixed at [facility] and shift here in order to  be given.  This is not efficient as he may be already EN route to [facility] and [place].  I have advised that I do feel it is appropriate to wait for him to arrived to [facility] in order to get the medication that is currently be mixed at [facility].      We do not have the appropriate services available to continue with definitive care of the patient available at our facility.  The physician I consulted with at the accepting facility expressed an understanding of the patient's condition and the need for transfer.  The transfer has been accepted.     The patient has been re-evaluated before the transfer, and the condition remains stable.  I have Informed the pt and family (if available) that there is a need for transfer for a higher level of care and for services unavailable at our facility.  I have discussed test results, shared the treatment plan, and discussed the need for transfer with the patient and/or family at the bedside.  All historical, clinical, radiographic, and laboratory findings were reviewed with the patient/family in detail.  I feel that transfer is medically necessary at this time.  I have discussed, and it is understood, that there could be unforeseen motor vehicle accidents or loss of vital signs while en route to the transferring facility, resulting in potential death or permanent disability. Pt and/or family express understanding at this time and agree to proceed with the transfer.  All questions were answered.  Pt and family have no further questions or concerns at this time. Pt is ready for transfer.        Clinical Impression:  Final diagnoses:  [R41.82] Altered mental state          ED Disposition Condition    Transfer to Another Facility Stable                      [1]         Dominguez Lozano MD  05/21/25 4660

## 2025-05-22 LAB
ANION GAP SERPL CALC-SCNC: 10 MEQ/L
BASOPHILS # BLD AUTO: 0.02 X10(3)/MCL
BASOPHILS NFR BLD AUTO: 0.1 %
BUN SERPL-MCNC: 8.1 MG/DL (ref 7–18.7)
CALCIUM SERPL-MCNC: 8.5 MG/DL (ref 8.4–10.2)
CHLORIDE SERPL-SCNC: 107 MMOL/L (ref 98–107)
CK SERPL-CCNC: 106 U/L (ref 29–168)
CO2 SERPL-SCNC: 23 MMOL/L (ref 22–29)
CREAT SERPL-MCNC: 0.54 MG/DL (ref 0.55–1.02)
CREAT/UREA NIT SERPL: 15
EOSINOPHIL # BLD AUTO: 0 X10(3)/MCL (ref 0–0.9)
EOSINOPHIL NFR BLD AUTO: 0 %
ERYTHROCYTE [DISTWIDTH] IN BLOOD BY AUTOMATED COUNT: 12.3 % (ref 11.5–17)
EST. AVERAGE GLUCOSE BLD GHB EST-MCNC: 105.4 MG/DL
GFR SERPLBLD CREATININE-BSD FMLA CKD-EPI: >60 ML/MIN/1.73/M2
GLUCOSE SERPL-MCNC: 122 MG/DL (ref 74–100)
HBA1C MFR BLD: 5.3 %
HCT VFR BLD AUTO: 38.7 % (ref 37–47)
HGB BLD-MCNC: 13.4 G/DL (ref 12–16)
IMM GRANULOCYTES # BLD AUTO: 0.05 X10(3)/MCL (ref 0–0.04)
IMM GRANULOCYTES NFR BLD AUTO: 0.3 %
LACTATE SERPL-SCNC: 1.4 MMOL/L (ref 0.5–2.2)
LYMPHOCYTES # BLD AUTO: 2.02 X10(3)/MCL (ref 0.6–4.6)
LYMPHOCYTES NFR BLD AUTO: 14 %
MCH RBC QN AUTO: 31 PG (ref 27–31)
MCHC RBC AUTO-ENTMCNC: 34.6 G/DL (ref 33–36)
MCV RBC AUTO: 89.6 FL (ref 80–94)
MONOCYTES # BLD AUTO: 0.28 X10(3)/MCL (ref 0.1–1.3)
MONOCYTES NFR BLD AUTO: 1.9 %
NEUTROPHILS # BLD AUTO: 12.01 X10(3)/MCL (ref 2.1–9.2)
NEUTROPHILS NFR BLD AUTO: 83.7 %
NRBC BLD AUTO-RTO: 0 %
OHS QRS DURATION: 90 MS
OHS QTC CALCULATION: 461 MS
PLATELET # BLD AUTO: 313 X10(3)/MCL (ref 130–400)
PMV BLD AUTO: 10.4 FL (ref 7.4–10.4)
POTASSIUM SERPL-SCNC: 3.4 MMOL/L (ref 3.5–5.1)
RBC # BLD AUTO: 4.32 X10(6)/MCL (ref 4.2–5.4)
SODIUM SERPL-SCNC: 140 MMOL/L (ref 136–145)
WBC # BLD AUTO: 14.38 X10(3)/MCL (ref 4.5–11.5)

## 2025-05-22 PROCEDURE — 25500020 PHARM REV CODE 255: Performed by: STUDENT IN AN ORGANIZED HEALTH CARE EDUCATION/TRAINING PROGRAM

## 2025-05-22 PROCEDURE — 25000003 PHARM REV CODE 250: Performed by: STUDENT IN AN ORGANIZED HEALTH CARE EDUCATION/TRAINING PROGRAM

## 2025-05-22 PROCEDURE — 95819 EEG AWAKE AND ASLEEP: CPT

## 2025-05-22 PROCEDURE — 63600175 PHARM REV CODE 636 W HCPCS: Performed by: STUDENT IN AN ORGANIZED HEALTH CARE EDUCATION/TRAINING PROGRAM

## 2025-05-22 PROCEDURE — 80048 BASIC METABOLIC PNL TOTAL CA: CPT | Performed by: STUDENT IN AN ORGANIZED HEALTH CARE EDUCATION/TRAINING PROGRAM

## 2025-05-22 PROCEDURE — 95819 EEG AWAKE AND ASLEEP: CPT | Mod: ,,, | Performed by: PSYCHIATRY & NEUROLOGY

## 2025-05-22 PROCEDURE — A9577 INJ MULTIHANCE: HCPCS | Performed by: STUDENT IN AN ORGANIZED HEALTH CARE EDUCATION/TRAINING PROGRAM

## 2025-05-22 PROCEDURE — 25000003 PHARM REV CODE 250: Performed by: INTERNAL MEDICINE

## 2025-05-22 PROCEDURE — 21400001 HC TELEMETRY ROOM

## 2025-05-22 PROCEDURE — 99223 1ST HOSP IP/OBS HIGH 75: CPT | Mod: ,,, | Performed by: PSYCHIATRY & NEUROLOGY

## 2025-05-22 PROCEDURE — 36415 COLL VENOUS BLD VENIPUNCTURE: CPT | Performed by: STUDENT IN AN ORGANIZED HEALTH CARE EDUCATION/TRAINING PROGRAM

## 2025-05-22 PROCEDURE — 85025 COMPLETE CBC W/AUTO DIFF WBC: CPT | Performed by: STUDENT IN AN ORGANIZED HEALTH CARE EDUCATION/TRAINING PROGRAM

## 2025-05-22 PROCEDURE — 83036 HEMOGLOBIN GLYCOSYLATED A1C: CPT | Performed by: STUDENT IN AN ORGANIZED HEALTH CARE EDUCATION/TRAINING PROGRAM

## 2025-05-22 PROCEDURE — 83605 ASSAY OF LACTIC ACID: CPT | Performed by: STUDENT IN AN ORGANIZED HEALTH CARE EDUCATION/TRAINING PROGRAM

## 2025-05-22 RX ORDER — BUTALBITAL, ACETAMINOPHEN AND CAFFEINE 50; 325; 40 MG/1; MG/1; MG/1
1 TABLET ORAL EVERY 4 HOURS PRN
Status: DISCONTINUED | OUTPATIENT
Start: 2025-05-22 | End: 2025-05-23 | Stop reason: HOSPADM

## 2025-05-22 RX ADMIN — ENOXAPARIN SODIUM 40 MG: 40 INJECTION SUBCUTANEOUS at 05:05

## 2025-05-22 RX ADMIN — BUTALBITAL, ACETAMINOPHEN, AND CAFFEINE 1 TABLET: 325; 50; 40 TABLET ORAL at 09:05

## 2025-05-22 RX ADMIN — LEVETIRACETAM 500 MG: 100 INJECTION, SOLUTION INTRAVENOUS at 09:05

## 2025-05-22 RX ADMIN — GADOBENATE DIMEGLUMINE 15 ML: 529 INJECTION, SOLUTION INTRAVENOUS at 11:05

## 2025-05-22 NOTE — PLAN OF CARE
05/22/25 1337   Discharge Assessment   Assessment Type Discharge Planning Assessment   Confirmed/corrected address, phone number and insurance Yes   Confirmed Demographics Correct on Facesheet   Source of Information family   Communicated MARÍA with patient/caregiver Date not available/Unable to determine   People in Home child(roseanna), dependent;spouse   Name(s) of People in Home Blas Mullen   Do you expect to return to your current living situation? Yes   Do you have help at home or someone to help you manage your care at home? Yes   Who are your caregiver(s) and their phone number(s)? Blas   Prior to hospitilization cognitive status: Unable to Assess   Current cognitive status: Unable to Assess  (Patient asleep during assessment)   Walking or Climbing Stairs Difficulty no   Dressing/Bathing Difficulty no   Home Accessibility stairs to enter home   Number of Stairs, Main Entrance three   Stair Railings, Main Entrance railing on right side (ascending)   Home Layout Able to live on 1st floor   Equipment Currently Used at Home none   Patient currently being followed by outpatient case management? No   Do you currently have service(s) that help you manage your care at home? No   Do you have prescription coverage? No   Who is going to help you get home at discharge? Family   How do you get to doctors appointments? car, drives self;family or friend will provide   Are you on dialysis? No   Do you take coumadin? No   Discharge Plan A Home with family   Discharge Plan B Home Health   DME Needed Upon Discharge  none   Discharge Plan discussed with: Spouse/sig other   Transition of Care Barriers Unisured   Transportation Needs   In the past 12 months, has lack of transportation kept you from medical appointments or from getting medications? no   In the past 12 months, has lack of transportation kept you from meetings, work, or from getting things needed for daily living? No   Food Insecurity   Within the past 12 months,  you worried that your food would run out before you got the money to buy more. Never true   Within the past 12 months, the food you bought just didn't last and you didn't have money to get more. Never true   Utilities   In the past 12 months has the electric, gas, oil, or water company threatened to shut off services in your home? No     Patient asleep during rounding. Dc assessment completed with patient's spouse. Patient was independent prior to hospital admission. She is not current with home health and does not utilize DME  in the home. Patient does not have a PCP, referral sent via [facility] to [facility].       Pharmacy : [facility]

## 2025-05-22 NOTE — SUBJECTIVE & OBJECTIVE
No past medical history on file.    No past surgical history on file.    Review of patient's allergies indicates:  No Known Allergies    Current Neurological Medications: \    Current Facility-Administered Medications on File Prior to Encounter   Medication    [COMPLETED] cefTRIAXone injection 2 g    [COMPLETED] dexAMETHasone injection 12 mg    [COMPLETED] LIDOcaine HCL 10 mg/ml (1%) injection 10 mL    [COMPLETED] LORazepam injection 1 mg    [COMPLETED] ondansetron injection 4 mg    [COMPLETED] sodium chloride 0.9% bolus 1,000 mL 1,000 mL    [COMPLETED] vancomycin (VANCOCIN) 2,000 mg in 0.9% NaCl 500 mL IVPB    [DISCONTINUED] acyclovir 520 mg in 0.9% NaCl 100 mL IVPB    [DISCONTINUED] acyclovir 700 mg in 0.9% NaCl 100 mL IVPB     No current outpatient medications on file prior to encounter.     Family History    None       Tobacco Use    Smoking status: Not on file    Smokeless tobacco: Not on file   Substance and Sexual Activity    Alcohol use: Not on file    Drug use: Not on file    Sexual activity: Not on file     Review of Systems  A 14pt ros was reviewed & is negative unless o/w documented in the hpi    Objective:     Vital Signs (Most Recent):  Temp: 97.5 °F (36.4 °C) (05/22/25 1109)  Pulse: (!) 59 (05/22/25 1109)  Resp: 16 (05/21/25 2340)  BP: 107/70 (05/22/25 1109)  SpO2: 98 % (05/22/25 1109) Vital Signs (24h Range):  Temp:  [97.4 °F (36.3 °C)-98.1 °F (36.7 °C)] 97.5 °F (36.4 °C)  Pulse:  [] 59  Resp:  [12-24] 16  SpO2:  [95 %-100 %] 98 %  BP: ()/(48-91) 107/70     Weight: 82.6 kg (182 lb)  Body mass index is 32.24 kg/m².     Physical Exam  Vitals reviewed.   Constitutional:       General: She is awake.      Appearance: Normal appearance.   HENT:      Head: Normocephalic and atraumatic.      Nose: Nose normal.      Mouth/Throat:      Mouth: Mucous membranes are moist.      Pharynx: Oropharynx is clear.   Eyes:      Extraocular Movements: Extraocular movements intact and EOM normal.      Pupils:  Pupils are equal, round, and reactive to light.   Pulmonary:      Effort: Pulmonary effort is normal.   Musculoskeletal:         General: Normal range of motion.      Cervical back: Normal range of motion.   Skin:     General: Skin is warm and dry.   Neurological:      General: No focal deficit present.      Mental Status: She is alert and oriented to person, place, and time.      Motor: Motor strength is normal.     Coordination: Finger-Nose-Finger Test normal.      Deep Tendon Reflexes:      Reflex Scores:       Bicep reflexes are 2+ on the right side and 2+ on the left side.       Brachioradialis reflexes are 2+ on the right side and 2+ on the left side.       Patellar reflexes are 2+ on the right side and 2+ on the left side.       Achilles reflexes are 1+ on the right side and 1+ on the left side.  Psychiatric:         Mood and Affect: Mood normal.         Speech: Speech normal.         Behavior: Behavior normal. Behavior is cooperative.         Thought Content: Thought content normal.         Judgment: Judgment normal.          NEUROLOGICAL EXAMINATION:     MENTAL STATUS   Oriented to person, place, and time.   Follows 3 step commands.   Attention: normal. Concentration: normal.   Speech: speech is normal   Level of consciousness: alert  Knowledge: good.   Normal comprehension.     CRANIAL NERVES     CN II   Visual fields full to confrontation.     CN III, IV, VI   Pupils are equal, round, and reactive to light.  Extraocular motions are normal.   Nystagmus: none   Conjugate gaze: present    CN V   Facial sensation intact.     CN VII   Facial expression full, symmetric.     MOTOR EXAM   Muscle bulk: normal  Overall muscle tone: normal  Right arm pronator drift: absent  Left arm pronator drift: absent    Strength   Strength 5/5 throughout.     REFLEXES     Reflexes   Right brachioradialis: 2+  Left brachioradialis: 2+  Right biceps: 2+  Left biceps: 2+  Right patellar: 2+  Left patellar: 2+  Right achilles:  1+  Left achilles: 1+  Right plantar: normal  Right ankle clonus: absent  Left ankle clonus: absent    SENSORY EXAM   Light touch normal.     GAIT AND COORDINATION      Coordination   Finger to nose coordination: normal      Significant Labs:   Recent Lab Results  (Last 5 results in the past 24 hours)        05/22/25  0804   05/21/25  1632   05/21/25  1529   05/21/25  1436   05/21/25  1402        Phencyclidine   Negative             Albumin/Globulin Ratio         1.0       Albumin         4.1       ALP         77       ALT         19       Amphetamines, Urine   Negative             Anion Gap 10.0         12.0       Appear CSF     Clear           Appearance, UA   Clear             AST         22       Bacteria, UA   Few             Barbituates, Urine   Negative             Baso # 0.02         0.03       Basophil % 0.1         0.2       Benzodiazepine, Urine   Negative             Bilirubin (UA)   Negative             BILIRUBIN TOTAL         0.3       Body Fluid Culture, Sterile     No Growth At 24 Hours  [P]           BUN 8.1         14.1       BUN/CREAT RATIO 15         21       Calcium 8.5         9.8       Cannabinoids, Urine   Negative             Chloride 107         102       CO2 23         23       Cocaine, Urine   Negative             COLOR CSF     Colorless           Color, UA   Straw             CPK         106       Creatinine 0.54         0.66       eGFR >60  Comment: Estimated GFR calculated using the CKD-EPI creatinine (2021) equation.         >60  Comment: Estimated GFR calculated using the CKD-EPI creatinine (2021) equation.       Eos # 0.00         0.07       Eos % 0.0         0.5       Estimated Avg Glucose 105.4               Fentanyl, Urine   Negative             Globulin, Total         4.2       Glucose 122         121       Glucose CSF     78           Glucose, UA   Negative             Hematocrit 38.7         42.6       Hemoglobin 13.4         15.2       Hemoglobin A1C External 5.3                Immature Grans (Abs) 0.05         0.04       Immature Granulocytes 0.3         0.3       Ketones, UA   Negative             Lactic Acid Level 1.4       3.9  Comment: Critical Result called and verified by verbal readback to: CALLED TO JOSELNIE RUIZ RN @ 0189 on 05/21/2025 at 15:09. Reported by [id number].   5.3  Comment: Critical Result called and verified by verbal readback to: CALLED TO JERRY OSBORNE RN @ 0039 on 05/21/2025 at 14:35. Reported by [id number].       Leukocyte Esterase, UA   Negative             Lymph # 2.02         3.92       LYMPH % 14.0         26.2       Lymphocyte % CSF     95           Magnesium          1.90       MCH 31.0         31.9       MCHC 34.6         35.7       MCV 89.6         89.3       Mono # 0.28         0.70       Mono % 1.9         4.7       Monocyte % CSF     4           MPV 10.4         10.2       Neut # 12.01         10.21       Neut % 83.7         68.1       Neutrophils % CSF     1           NITRITE UA   Negative             nRBC 0.0               Blood, UA   Moderate             QRS Duration               OHS QTC Calculation               Opiates, Urine   Negative             pH, UA   5.5             pH, Urine   5.5             Platelet Count 313         344       Potassium 3.4         3.5       Protein CSF      19.6           PROTEIN TOTAL         8.3       Protein, UA   100             RBC 4.32         4.77       RBC, CSF     0           RBC, UA   None Seen             RDW 12.3         12.0       Sodium 140         137       Specific Gravity,UA   >=1.030             Specific Gravity, Urine Auto   >=1.030             Squam Epithel, UA   Occasional             Total Nucleated Cells CSF     4           Troponin I         <0.010       Tube # CSF     3           Urobilinogen, UA   0.2             WBC, UA   0-5             WBC 14.38         14.97                               [P] - Preliminary Result               Significant Imaging:   MRI brain w/o:  FINDINGS:  INTRACRANIAL:  Brain parenchyma demonstrates normal signal and configuration.  No parenchymal restricted diffusion.  No evidence of intracranial hemorrhage.  No extra-axial fluid collection or mass.  No intracranial mass effect.  No hydrocephalus.  Midline structures have a normal configuration.  Visualized pituitary gland and infundibulum are normal.  Visualized major intracranial vascular structures demonstrate normal flow voids and are normal in course and caliber.     SINUSES: Paranasal sinuses and mastoid air cells are clear.     ORBITS: Visualized orbits are normal.     Impression:     Unremarkable exam.    I have reviewed all pertinent imaging results/findings within the past 24 hours.

## 2025-05-22 NOTE — ASSESSMENT & PLAN NOTE
EEG revealing for L temporal sharp waves  MRI brain w/o contrast unrevealing for acute intracranial abnormalities    -Continue keppra 500 mg BID   -Give ativan 2 mg PRN for witnessed seizures  -MRI brain with contrast ordered  -Seizure precautions

## 2025-05-22 NOTE — H&P
[facility]  Emergency [facility] MEDICINE - H&P ADMISSION NOTE      Patient Name: Kaylin Larios  MRN: [id number]  Patient Class: Emergency   Admission Date: 2025   Admitting Physician: MICHELE Service   Attending Physician: Thairy G Reyes, DO  Primary Care Provider: No primary care provider on file.  Face-to-Face encounter date: 2025        CHIEF COMPLAINT     Chief Complaint   Patient presents with    Transfer     Arrives aasi unit 28 from [facility] reported AMS/seizure       HISTORY OF PRESENTING ILLNESS   34-year-old  female with no known past medical history presenting from [facility] for new onset seizure.  Patient states she was at work when she suddenly started seizing.  She was surrounded by her mother and siblings who noticed that her eyes rolled to the back of her head and she had to be held up.  She responded appropriately shortly thereafter however the patient states she does not remember anything that happened until she was in the ambulance.  She does affirm to a headache and feeling poorly the day prior to this event.  Also affirms to not eating day of seizure however her random blood glucose was 121.  At the time of my exam she denies any complaints, nonfocal.    At baseline the patient is from [place], lives in [place] but is currently working locally temporarily as a [employer].  She is independent.  PAST MEDICAL HISTORY   No past medical history on file.    PAST SURGICAL HISTORY   No past surgical history on file.    FAMILY HISTORY   Reviewed and noncontributory to this case    SOCIAL HISTORY   Social History[1]    HOME MEDICATIONS     Prior to Admission medications    Not on File       ALLERGIES   Patient has no known allergies.    REVIEW OF SYSTEMS   Except as documented above, all other systems reviewed and negative    PHYSICAL EXAM     Vitals:    25 1934   BP: 120/78   Pulse: 93   Resp: 16   Temp: 98.1 °F (36.7 °C)      General:  Obese  Head and neck:  Atraumatic,  normocephalic, moist mucous membranes, supple neck  Chest:  Clear to auscultation bilaterally  Heart:  S1, S2, no appreciable murmur  Abdomen:  Soft, nontender, BS +  MSK:  Warm, no lower extremity edema, no clubbing or cyanosis  Neuro:  Alert and oriented x4, moving all extremities with good strength  Integumentary:  No obvious skin rash  Psychiatry:  Appropriate mood and affect  ASSESSMENT AND PLAN   New onset seizure   -CSF from outlying facility reviewed, glucose slightly elevated otherwise unremarkable, low suspicion for bacterial meningitis   -CT head with no acute findings   -pending EEG, MRI   -empirically started on IV Keppra 500 b.i.d., can transition to oral in the morning  -neurology consulted for further evaluation    Elevated lactic acid   -source unclear at this time however it is downtrending    DVT prophylaxis:  Lovenox  Screening for Social Drivers for health:  Patient screened for food insecurity, housing instability, transportation needs, utility difficulties, and interpersonal safety (select all that apply as identified as concern)  []Housing or Food  []Transportation Needs  []Utility Difficulties  []Interpersonal safety  [x]None  __________________________________________________________________  LABS/MICRO/MEDS/DIAGNOSTICS       LABS  Recent Labs     05/21/25  1402      K 3.5   CO2 23   BUN 14.1   CREATININE 0.66   CALCIUM 9.8   ALKPHOS 77   AST 22   ALT 19   ALBUMIN 4.1     Recent Labs     05/21/25  1402   WBC 14.97*   RBC 4.77   HCT 42.6   MCV 89.3          MICROBIOLOGY  Microbiology Results (last 7 days)       ** No results found for the last 168 hours. **            MEDICATIONS     INFUSIONS      DIAGNOSTIC TESTS  No orders to display          Patient information was obtained from patient, patient's family, past medical records and ER records.   All diagnosis and differential diagnosis have been reviewed; assessment and plan has been documented. I have personally reviewed the  labs and test results that are presently available; I have reviewed the patients medication list. I have reviewed the consulting providers response and recommendations. I have reviewed or attempted to review medical records based upon their availability.  All of the patient's questions have been addressed and answered. Patient's is agreeable to the above stated plan. I will continue to monitor closely and make adjustments to medical management as needed.  This note was created using katena voice recognition software that occasionally misinterpreted phrases or words.  Please contact me if any questions may rise regarding documentation to clarify verbiage.        Thairy G Reyes, DO   Internal Medicine  Department of Hospital Medicine  [facility] - Emergency Dept         [1]   Social History  Socioeconomic History    Marital status: Single     Social Drivers of Health     Food Insecurity: Unknown (9/1/2023)    Received from [facility]    Hunger Vital Sign     Ran Out of Food in the Last Year: Never true   Transportation Needs: Unknown (9/1/2023)    Received from [facility]    PRAPARE - Transportation     Lack of Transportation (Medical): No   Housing Stability: Unknown (9/1/2023)    Received from [facility]    Housing Stability Vital Sign     Unstable Housing in the Last Year: No

## 2025-05-22 NOTE — CONSULTS
"[facility] - Neurology  Neurology  Consult Note    Patient Name: Kaylin Larios  MRN: [id number]  Admission Date: 5/21/2025  Hospital Length of Stay: 1 days  Code Status: Full Code   Attending Provider: Reyes, Thairy G, DO   Consulting Provider: SHAYLEE Cali  Primary Care Physician: No primary care provider on file.  Principal Problem:New onset seizure    Inpatient Consult to Neurology Services (General Neurology)  Consult performed by: Risa Olmstead AGACNP-BC  Consult ordered by: Ellen Nielson MD         Subjective:     Chief Complaint:       HPI:   34-year-old female with no PMH who presented to [facility] ED on 5/21 following a seizure-like event.  Patient was at work when initial seizure-like activity, described as sudden LOC, fixed gaze deviation and increased rigidity, lasting approx 2 min, and followed by postictal disorientation.  Additional seizure episode occurred en route with EMS with similar presentation.  Reports "worst headache of her life" that began the day prior to the seizure events, otherwise denies focal neurologic deficits, sleep deprivation, recent illnesses, or recent medication changes.  Was started on Keppra 500 mg b.i.d. in ED.    CT head w/o and MRI brain w/o unrevealing for acute intracranial abnormalities.  Labs revealed mild leukocytosis, otherwise unremarkable.  Underwent LP in ED, preliminary CSF analysis unremarkable, and was started on prophylactic antimicrobial CNS coverage.    Of note patient is from [place], here for work, and [employer] for occupation.    History obtained from patient.  Patient is Turkmen speaking,  used.     No past medical history on file.    No past surgical history on file.    Review of patient's allergies indicates:  No Known Allergies    Current Neurological Medications: \    Current Facility-Administered Medications on File Prior to Encounter   Medication    [COMPLETED] cefTRIAXone injection 2 g    [COMPLETED] " dexAMETHasone injection 12 mg    [COMPLETED] LIDOcaine HCL 10 mg/ml (1%) injection 10 mL    [COMPLETED] LORazepam injection 1 mg    [COMPLETED] ondansetron injection 4 mg    [COMPLETED] sodium chloride 0.9% bolus 1,000 mL 1,000 mL    [COMPLETED] vancomycin (VANCOCIN) 2,000 mg in 0.9% NaCl 500 mL IVPB    [DISCONTINUED] acyclovir 520 mg in 0.9% NaCl 100 mL IVPB    [DISCONTINUED] acyclovir 700 mg in 0.9% NaCl 100 mL IVPB     No current outpatient medications on file prior to encounter.     Family History    None       Tobacco Use    Smoking status: Not on file    Smokeless tobacco: Not on file   Substance and Sexual Activity    Alcohol use: Not on file    Drug use: Not on file    Sexual activity: Not on file     Review of Systems  A 14pt ros was reviewed & is negative unless o/w documented in the hpi    Objective:     Vital Signs (Most Recent):  Temp: 97.5 °F (36.4 °C) (05/22/25 1109)  Pulse: (!) 59 (05/22/25 1109)  Resp: 16 (05/21/25 2340)  BP: 107/70 (05/22/25 1109)  SpO2: 98 % (05/22/25 1109) Vital Signs (24h Range):  Temp:  [97.4 °F (36.3 °C)-98.1 °F (36.7 °C)] 97.5 °F (36.4 °C)  Pulse:  [] 59  Resp:  [12-24] 16  SpO2:  [95 %-100 %] 98 %  BP: ()/(48-91) 107/70     Weight: 82.6 kg (182 lb)  Body mass index is 32.24 kg/m².     Physical Exam  Vitals reviewed.   Constitutional:       General: She is awake.      Appearance: Normal appearance.   HENT:      Head: Normocephalic and atraumatic.      Nose: Nose normal.      Mouth/Throat:      Mouth: Mucous membranes are moist.      Pharynx: Oropharynx is clear.   Eyes:      Extraocular Movements: Extraocular movements intact and EOM normal.      Pupils: Pupils are equal, round, and reactive to light.   Pulmonary:      Effort: Pulmonary effort is normal.   Musculoskeletal:         General: Normal range of motion.      Cervical back: Normal range of motion.   Skin:     General: Skin is warm and dry.   Neurological:      General: No focal deficit present.       Mental Status: She is alert and oriented to person, place, and time.      Motor: Motor strength is normal.     Coordination: Finger-Nose-Finger Test normal.      Deep Tendon Reflexes:      Reflex Scores:       Bicep reflexes are 2+ on the right side and 2+ on the left side.       Brachioradialis reflexes are 2+ on the right side and 2+ on the left side.       Patellar reflexes are 2+ on the right side and 2+ on the left side.       Achilles reflexes are 1+ on the right side and 1+ on the left side.  Psychiatric:         Mood and Affect: Mood normal.         Speech: Speech normal.         Behavior: Behavior normal. Behavior is cooperative.         Thought Content: Thought content normal.         Judgment: Judgment normal.          NEUROLOGICAL EXAMINATION:     MENTAL STATUS   Oriented to person, place, and time.   Follows 3 step commands.   Attention: normal. Concentration: normal.   Speech: speech is normal   Level of consciousness: alert  Knowledge: good.   Normal comprehension.     CRANIAL NERVES     CN II   Visual fields full to confrontation.     CN III, IV, VI   Pupils are equal, round, and reactive to light.  Extraocular motions are normal.   Nystagmus: none   Conjugate gaze: present    CN V   Facial sensation intact.     CN VII   Facial expression full, symmetric.     MOTOR EXAM   Muscle bulk: normal  Overall muscle tone: normal  Right arm pronator drift: absent  Left arm pronator drift: absent    Strength   Strength 5/5 throughout.     REFLEXES     Reflexes   Right brachioradialis: 2+  Left brachioradialis: 2+  Right biceps: 2+  Left biceps: 2+  Right patellar: 2+  Left patellar: 2+  Right achilles: 1+  Left achilles: 1+  Right plantar: normal  Right ankle clonus: absent  Left ankle clonus: absent    SENSORY EXAM   Light touch normal.     GAIT AND COORDINATION      Coordination   Finger to nose coordination: normal      Significant Labs:   Recent Lab Results  (Last 5 results in the past 24 hours)         05/22/25  0804   05/21/25  1632   05/21/25  1529   05/21/25  1436   05/21/25  1402        Phencyclidine   Negative             Albumin/Globulin Ratio         1.0       Albumin         4.1       ALP         77       ALT         19       Amphetamines, Urine   Negative             Anion Gap 10.0         12.0       Appear CSF     Clear           Appearance, UA   Clear             AST         22       Bacteria, UA   Few             Barbituates, Urine   Negative             Baso # 0.02         0.03       Basophil % 0.1         0.2       Benzodiazepine, Urine   Negative             Bilirubin (UA)   Negative             BILIRUBIN TOTAL         0.3       Body Fluid Culture, Sterile     No Growth At 24 Hours  [P]           BUN 8.1         14.1       BUN/CREAT RATIO 15         21       Calcium 8.5         9.8       Cannabinoids, Urine   Negative             Chloride 107         102       CO2 23         23       Cocaine, Urine   Negative             COLOR CSF     Colorless           Color, UA   Straw             CPK         106       Creatinine 0.54         0.66       eGFR >60  Comment: Estimated GFR calculated using the CKD-EPI creatinine (2021) equation.         >60  Comment: Estimated GFR calculated using the CKD-EPI creatinine (2021) equation.       Eos # 0.00         0.07       Eos % 0.0         0.5       Estimated Avg Glucose 105.4               Fentanyl, Urine   Negative             Globulin, Total         4.2       Glucose 122         121       Glucose CSF     78           Glucose, UA   Negative             Hematocrit 38.7         42.6       Hemoglobin 13.4         15.2       Hemoglobin A1C External 5.3               Immature Grans (Abs) 0.05         0.04       Immature Granulocytes 0.3         0.3       Ketones, UA   Negative             Lactic Acid Level 1.4       3.9  Comment: Critical Result called and verified by verbal readback to: CALLED TO JOSELINE RUIZ RN @ 3620 on 05/21/2025 at 15:09. Reported by [id number].    5.3  Comment: Critical Result called and verified by verbal readback to: CALLED TO JERRY OSBORNE RN @ 0805 on 05/21/2025 at 14:35. Reported by [id number].       Leukocyte Esterase, UA   Negative             Lymph # 2.02         3.92       LYMPH % 14.0         26.2       Lymphocyte % CSF     95           Magnesium          1.90       MCH 31.0         31.9       MCHC 34.6         35.7       MCV 89.6         89.3       Mono # 0.28         0.70       Mono % 1.9         4.7       Monocyte % CSF     4           MPV 10.4         10.2       Neut # 12.01         10.21       Neut % 83.7         68.1       Neutrophils % CSF     1           NITRITE UA   Negative             nRBC 0.0               Blood, UA   Moderate             QRS Duration               OHS QTC Calculation               Opiates, Urine   Negative             pH, UA   5.5             pH, Urine   5.5             Platelet Count 313         344       Potassium 3.4         3.5       Protein CSF      19.6           PROTEIN TOTAL         8.3       Protein, UA   100             RBC 4.32         4.77       RBC, CSF     0           RBC, UA   None Seen             RDW 12.3         12.0       Sodium 140         137       Specific Gravity,UA   >=1.030             Specific Gravity, Urine Auto   >=1.030             Squam Epithel, UA   Occasional             Total Nucleated Cells CSF     4           Troponin I         <0.010       Tube # CSF     3           Urobilinogen, UA   0.2             WBC, UA   0-5             WBC 14.38         14.97                               [P] - Preliminary Result               Significant Imaging:   MRI brain w/o:  FINDINGS:  INTRACRANIAL: Brain parenchyma demonstrates normal signal and configuration.  No parenchymal restricted diffusion.  No evidence of intracranial hemorrhage.  No extra-axial fluid collection or mass.  No intracranial mass effect.  No hydrocephalus.  Midline structures have a normal configuration.  Visualized pituitary gland  and infundibulum are normal.  Visualized major intracranial vascular structures demonstrate normal flow voids and are normal in course and caliber.     SINUSES: Paranasal sinuses and mastoid air cells are clear.     ORBITS: Visualized orbits are normal.     Impression:     Unremarkable exam.    I have reviewed all pertinent imaging results/findings within the past 24 hours.  Assessment and Plan:     * New onset seizure  EEG revealing for L temporal sharp waves  MRI brain w/o contrast unrevealing for acute intracranial abnormalities    -Continue keppra 500 mg BID   -Give ativan 2 mg PRN for witnessed seizures  -MRI brain with contrast ordered  -Seizure precautions          VTE Risk Mitigation (From admission, onward)           Ordered     enoxaparin injection 40 mg  Daily         05/21/25 2142     IP VTE HIGH RISK PATIENT  Once         05/21/25 2142     Place sequential compression device  Until discontinued         05/21/25 2142                    Thank you for your consult. Further recommendations to follow per JEFF Ohara-BC  Neurology  [facility] - Neurology

## 2025-05-22 NOTE — PLAN OF CARE
Problem: Adult Inpatient Plan of Care  Goal: Plan of Care Review  Outcome: Progressing  Goal: Absence of Hospital-Acquired Illness or Injury  Outcome: Progressing     Problem: Infection  Goal: Absence of Infection Signs and Symptoms  Outcome: Progressing     Problem: Seizure, Active Management  Goal: Absence of Seizure/Seizure-Related Injury  Outcome: Progressing

## 2025-05-22 NOTE — HPI
"34-year-old female with no PMH who presented to [facility] ED on 5/21 following a seizure-like event.  Patient was at work when initial seizure-like activity, described as sudden LOC, fixed gaze deviation and increased rigidity, lasting approx 2 min, and followed by postictal disorientation.  Additional seizure episode occurred en route with EMS with similar presentation.  Reports "worst headache of her life" that began the day prior to the seizure events, otherwise denies focal neurologic deficits, sleep deprivation, recent illnesses, or recent medication changes.  Was started on Keppra 500 mg b.i.d. in ED.    CT head w/o and MRI brain w/o unrevealing for acute intracranial abnormalities.  Labs revealed mild leukocytosis, otherwise unremarkable.  Underwent LP in ED, preliminary CSF analysis unremarkable, and was started on prophylactic antimicrobial CNS coverage.    Of note patient is from [place], here for work, and [employer] for occupation.    History obtained from patient.  Patient is Mohawk speaking,  used.  "

## 2025-05-22 NOTE — ED PROVIDER NOTES
Encounter Date: 5/21/2025       History     Chief Complaint   Patient presents with    Transfer     Arrives aasi unit 28 from [facility] reported AMS/seizure     34-year-old female presents to the emergency department as transfer [facility] for neurology services after presenting there with severe headache, 2 episodes of seizure-like activity witnessed initially by the has been and then by 1st responders.  No episodes witnessed in the emergency department.  She presented there afebrile, GCS 14 for confusion, tachycardic with heart rate in the 130s but BP stable.  Laboratory studies with mild leukocytosis, elevated lactic acid which was clearing on repeat.  CT head demonstrated no acute findings.  Lumbar puncture performed demonstrated 1 nucleated cell, no RBCs.  She was given vancomycin, ceftriaxone and acyclovir along with 12 mg IV dexamethasone and 1 L IV fluids, transferred here for further evaluation and management.      The history is provided by the patient, medical records and the spouse. The history is limited by a language barrier. A  was used.         Review of patient's allergies indicates:  No Known Allergies  No past medical history on file.  No past surgical history on file.  No family history on file.  Social History[1]  Review of Systems   Constitutional:  Negative for fever.   Gastrointestinal:  Negative for nausea and vomiting.   Neurological:  Positive for seizures and headaches.       Physical Exam     Initial Vitals [05/21/25 1934]   BP Pulse Resp Temp SpO2   120/78 93 16 98.1 °F (36.7 °C) 96 %      MAP       --         Physical Exam    Nursing note and vitals reviewed.  Constitutional: She appears well-developed and well-nourished. No distress.   HENT:   Head: Normocephalic and atraumatic. Mouth/Throat: Oropharynx is clear and moist.   Bite pennie to left lateral tongue   Eyes: Conjunctivae are normal. Pupils are equal, round, and reactive to light.   Neck: Neck supple.    No nuchal rigidity   Normal range of motion.  Cardiovascular:  Normal rate, regular rhythm and normal heart sounds.           Pulmonary/Chest: Breath sounds normal. No respiratory distress.   Abdominal: Abdomen is soft. She exhibits no distension. There is no abdominal tenderness.   Musculoskeletal:         General: Normal range of motion.      Cervical back: Normal range of motion and neck supple.     Neurological: She is alert and oriented to person, place, and time. She has normal strength. No cranial nerve deficit or sensory deficit. GCS score is 15. GCS eye subscore is 4. GCS verbal subscore is 5. GCS motor subscore is 6.   Skin: Skin is warm and dry. No rash noted.   Psychiatric: She has a normal mood and affect.         ED Course   Procedures  Labs Reviewed          Imaging Results    None          Medications   0.9% NaCl infusion ( Intravenous New Bag 5/21/25 2230)   LORazepam injection 2 mg (has no administration in time range)   levETIRAcetam (Keppra) 500 mg in D5W 100 mL IVPB (MB+) (has no administration in time range)   sodium chloride 0.9% flush 10 mL (has no administration in time range)   enoxaparin injection 40 mg (40 mg Subcutaneous Given 5/21/25 2210)   albuterol-ipratropium 2.5 mg-0.5 mg/3 mL nebulizer solution 3 mL (has no administration in time range)   melatonin tablet 9 mg (has no administration in time range)   ondansetron disintegrating tablet 8 mg (has no administration in time range)   ondansetron injection 4 mg (has no administration in time range)   polyethylene glycol packet 17 g (has no administration in time range)   bisacodyL suppository 10 mg (has no administration in time range)   simethicone chewable tablet 80 mg (has no administration in time range)   aluminum-magnesium hydroxide-simethicone 200-200-20 mg/5 mL suspension 30 mL (has no administration in time range)   acetaminophen tablet 650 mg (has no administration in time range)   HYDROcodone-acetaminophen 5-325 mg per tablet  1 tablet (1 tablet Oral Given 5/21/25 2340)   morphine injection 2 mg (has no administration in time range)   naloxone 0.4 mg/mL injection 0.02 mg (has no administration in time range)   glucose chewable tablet 16 g (has no administration in time range)   glucose chewable tablet 24 g (has no administration in time range)   dextrose 50% injection 12.5 g (has no administration in time range)   dextrose 50% injection 25 g (has no administration in time range)   glucagon (human recombinant) injection 1 mg (has no administration in time range)   levETIRAcetam in NaCl (iso-os) IVPB 1,000 mg (0 mg Intravenous Stopped 5/21/25 2157)   sodium chloride 0.9% bolus 1,000 mL 1,000 mL (0 mLs Intravenous Stopped 5/21/25 2310)     Medical Decision Making  Problems Addressed:  Acute nonintractable headache, unspecified headache type: acute illness or injury  New onset seizure: acute illness or injury    Risk  Prescription drug management.  Decision regarding hospitalization.      ED assessment:    Ms. Larios presented for evaluation neurology consultation with new onset seizure x2 today.  Tongue biting noted.  Workup with a previous facility with no acute findings on head CT, laboratory studies with mild leukocytosis, elevated lactic which is clearing, lumbar puncture performed with no significant findings.  Given antibiotics with CSF studies pending.  Only complaining of headache at this time.    Differential diagnosis (including but not limited to):   New onset seizure, CNS infection, electrolyte derangements, acute kidney injury, syncope, intracerebral mass lesion    ED management:   I reviewed the workup the previous facility as summarized in HPI above.  Discussed with on-call for hospital medicine who accepts for admit, a.m. neurology consultation placed.  Keppra load given in ED.        Amount and/or Complexity of Data Reviewed  Independent historian: EMS   Summary of history:  Transfer from an outside hospital with witnessed  seizure x2.  Stable throughout transport.  GCS 15.  External data reviewed: transfer records, prior labs, and prior imaging  Summary of data reviewed:  As summarized in HPI above    Discussion of management or test interpretation with external provider(s): discussed with hospitalist physician   Summary of discussion:  Discussed with hospitalist NP who accepts for admit    Risk  Prescription drug management   Decision regarding hospitalization  Shared decision making     Critical Care  none    I, Ellen Nielson MD personally performed the history, PE, MDM, and procedures as documented above and agree with the scribe's documentation.                                     Clinical Impression:  Final diagnoses:  [R56.9] New onset seizure  [R51.9] Acute nonintractable headache, unspecified headache type          ED Disposition Condition    Admit                       [1]         Ellen Nielson MD  05/22/25 0612

## 2025-05-22 NOTE — PROCEDURES
EEG    Date/Time: 5/22/2025 10:10 AM    Performed by: Radha Gutierrez MD  Authorized by: Reyes, Thairy G, DO      Reason for exam: seizure      Technical description:  A standard digital EEG was performed at [facility].  The 10 20 international system of electrode placement is used.  Standard montages were recorded.  Activation procedures were performed.    Description:  The record was dominated by a 9  hertz posterior dominant rhythm which attenuated with eye opening activity.  During drowsiness, identified by ocular signs and alpha attenuation, there is diffuse asynchronous theta activity.  Stage 2 sleep was identified by vertex waves, sleep spindles, and K complexes. During sleep and drowsiness, there was occasional left temporal sharp waves.     Impression:  This is an abnormal EEG due to left temporal sharp waves. This can indicate a predisposition to seizures.

## 2025-05-22 NOTE — PROGRESS NOTES
"[facility] Hospital Medicine Progress Note        Chief Complaint: Inpatient Follow-up for seizure    HPI:   "34-year-old  female with no known past medical history presenting from [facility] for new onset seizure.  Patient states she was at work when she suddenly started seizing.  She was surrounded by her mother and siblings who noticed that her eyes rolled to the back of her head and she had to be held up.  She responded appropriately shortly thereafter however the patient states she does not remember anything that happened until she was in the ambulance.  She does affirm to a headache and feeling poorly the day prior to this event.  Also affirms to not eating day of seizure however her random blood glucose was 121.  At the time of my exam she denies any complaints, nonfocal. "      Interval Hx:   Alert, oriented, resting in the bed.  Complains of headache.  Mother was at bedside.  Leukocytosis continues.  Hemoglobin and platelets stable.  Mild hypokalemia seen today.  Lactic acidosis has resolved.  A1C 5.3     EEG was abnormal        Objective/physical exam:  Vitals:    25 2340 25 0015 25 0024 25 0027   BP:  (!) 76/48 (!) 89/59 (!) 99/53   Pulse:  69 65 63   Resp: 16      Temp:  98.1 °F (36.7 °C)     TempSrc:  Oral     SpO2:  98%     Weight:       Height:         General: In no acute distress, afebrile  Respiratory: Clear to auscultation bilaterally  Cardiovascular: S1, S2, no appreciable murmur  Abdomen: Soft, nontender, BS +  MSK: Warm, no lower extremity edema, no clubbing or cyanosis  Neurologic: Alert and oriented x4, moving all extremities with good strength     Lab Results   Component Value Date     2025    K 3.5 2025     2025    CO2 23 2025    BUN 14.1 2025    CREATININE 0.66 2025    CALCIUM 9.8 2025    EGFRNONAA >105 2017      Lab Results   Component Value Date    ALT 19 2025    AST " 22 05/21/2025    ALKPHOS 77 05/21/2025    BILITOT 0.3 05/21/2025      Lab Results   Component Value Date    WBC 14.97 (H) 05/21/2025    HGB 15.2 05/21/2025    HCT 42.6 05/21/2025    MCV 89.3 05/21/2025     05/21/2025           Medications:   enoxparin  40 mg Subcutaneous Daily    levETIRAcetam (Keppra) IV (PEDS and ADULTS)  500 mg Intravenous Q12H        Current Facility-Administered Medications:     acetaminophen, 650 mg, Oral, Q4H PRN    albuterol-ipratropium, 3 mL, Nebulization, Q6H PRN    aluminum-magnesium hydroxide-simethicone, 30 mL, Oral, QID PRN    bisacodyL, 10 mg, Rectal, Daily PRN    dextrose 50%, 12.5 g, Intravenous, PRN    dextrose 50%, 25 g, Intravenous, PRN    glucagon (human recombinant), 1 mg, Intramuscular, PRN    glucose, 16 g, Oral, PRN    glucose, 24 g, Oral, PRN    HYDROcodone-acetaminophen, 1 tablet, Oral, Q6H PRN    lorazepam, 2 mg, Intravenous, Q15 Min PRN    melatonin, 9 mg, Oral, Nightly PRN    morphine, 2 mg, Intravenous, Q6H PRN    naloxone, 0.02 mg, Intravenous, PRN    ondansetron, 8 mg, Oral, Q8H PRN    ondansetron, 4 mg, Intravenous, Q8H PRN    polyethylene glycol, 17 g, Oral, TID PRN    simethicone, 1 tablet, Oral, QID PRN    sodium chloride 0.9%, 10 mL, Intravenous, PRN     Assessment/Plan:      New onset seizure   Lactic acidosis due to above-resolved  Leukocytosis-likely reactive    Plan:   -continue seizure precautions.  EEG findings reviewed.  MRI brain was unremarkable.  Neuro following.  Continue Keppra at current dose  -Fioricet for headaches    [employer]    Steven Cunningham MD

## 2025-05-23 VITALS
HEIGHT: 63 IN | TEMPERATURE: 98 F | RESPIRATION RATE: 16 BRPM | OXYGEN SATURATION: 97 % | DIASTOLIC BLOOD PRESSURE: 58 MMHG | BODY MASS INDEX: 32.25 KG/M2 | SYSTOLIC BLOOD PRESSURE: 96 MMHG | HEART RATE: 53 BPM | WEIGHT: 182 LBS

## 2025-05-23 LAB
ALBUMIN SERPL-MCNC: 3.3 G/DL (ref 3.5–5)
ALBUMIN/GLOB SERPL: 1 RATIO (ref 1.1–2)
ALP SERPL-CCNC: 48 UNIT/L (ref 40–150)
ALT SERPL-CCNC: 14 UNIT/L (ref 0–55)
ANION GAP SERPL CALC-SCNC: 8 MEQ/L
AST SERPL-CCNC: 12 UNIT/L (ref 11–45)
BASOPHILS # BLD AUTO: 0.02 X10(3)/MCL
BASOPHILS NFR BLD AUTO: 0.2 %
BILIRUB SERPL-MCNC: 0.5 MG/DL
BUN SERPL-MCNC: 12.2 MG/DL (ref 7–18.7)
CALCIUM SERPL-MCNC: 8.3 MG/DL (ref 8.4–10.2)
CHLORIDE SERPL-SCNC: 106 MMOL/L (ref 98–107)
CO2 SERPL-SCNC: 26 MMOL/L (ref 22–29)
CREAT SERPL-MCNC: 0.56 MG/DL (ref 0.55–1.02)
CREAT/UREA NIT SERPL: 22
EOSINOPHIL # BLD AUTO: 0.01 X10(3)/MCL (ref 0–0.9)
EOSINOPHIL NFR BLD AUTO: 0.1 %
ERYTHROCYTE [DISTWIDTH] IN BLOOD BY AUTOMATED COUNT: 12.9 % (ref 11.5–17)
GFR SERPLBLD CREATININE-BSD FMLA CKD-EPI: >60 ML/MIN/1.73/M2
GLOBULIN SER-MCNC: 3.2 GM/DL (ref 2.4–3.5)
GLUCOSE SERPL-MCNC: 89 MG/DL (ref 74–100)
HCT VFR BLD AUTO: 36.7 % (ref 37–47)
HGB BLD-MCNC: 12.3 G/DL (ref 12–16)
IMM GRANULOCYTES # BLD AUTO: 0.03 X10(3)/MCL (ref 0–0.04)
IMM GRANULOCYTES NFR BLD AUTO: 0.2 %
LYMPHOCYTES # BLD AUTO: 5.2 X10(3)/MCL (ref 0.6–4.6)
LYMPHOCYTES NFR BLD AUTO: 40.6 %
MAGNESIUM SERPL-MCNC: 2 MG/DL (ref 1.6–2.6)
MCH RBC QN AUTO: 30.2 PG (ref 27–31)
MCHC RBC AUTO-ENTMCNC: 33.5 G/DL (ref 33–36)
MCV RBC AUTO: 90.2 FL (ref 80–94)
MONOCYTES # BLD AUTO: 0.61 X10(3)/MCL (ref 0.1–1.3)
MONOCYTES NFR BLD AUTO: 4.8 %
NEUTROPHILS # BLD AUTO: 6.94 X10(3)/MCL (ref 2.1–9.2)
NEUTROPHILS NFR BLD AUTO: 54.1 %
NRBC BLD AUTO-RTO: 0 %
OHS QRS DURATION: 94 MS
OHS QTC CALCULATION: 443 MS
PLATELET # BLD AUTO: 298 X10(3)/MCL (ref 130–400)
PMV BLD AUTO: 10.7 FL (ref 7.4–10.4)
POTASSIUM SERPL-SCNC: 2.9 MMOL/L (ref 3.5–5.1)
PROT SERPL-MCNC: 6.5 GM/DL (ref 6.4–8.3)
RBC # BLD AUTO: 4.07 X10(6)/MCL (ref 4.2–5.4)
SODIUM SERPL-SCNC: 140 MMOL/L (ref 136–145)
WBC # BLD AUTO: 12.81 X10(3)/MCL (ref 4.5–11.5)
WNV RNA CSF QL NAA+PROBE: NEGATIVE

## 2025-05-23 PROCEDURE — 85025 COMPLETE CBC W/AUTO DIFF WBC: CPT | Performed by: INTERNAL MEDICINE

## 2025-05-23 PROCEDURE — 93005 ELECTROCARDIOGRAM TRACING: CPT

## 2025-05-23 PROCEDURE — 36415 COLL VENOUS BLD VENIPUNCTURE: CPT | Performed by: INTERNAL MEDICINE

## 2025-05-23 PROCEDURE — 63600175 PHARM REV CODE 636 W HCPCS: Performed by: STUDENT IN AN ORGANIZED HEALTH CARE EDUCATION/TRAINING PROGRAM

## 2025-05-23 PROCEDURE — 83735 ASSAY OF MAGNESIUM: CPT | Performed by: INTERNAL MEDICINE

## 2025-05-23 PROCEDURE — 25000003 PHARM REV CODE 250: Performed by: STUDENT IN AN ORGANIZED HEALTH CARE EDUCATION/TRAINING PROGRAM

## 2025-05-23 PROCEDURE — 80053 COMPREHEN METABOLIC PANEL: CPT | Performed by: INTERNAL MEDICINE

## 2025-05-23 PROCEDURE — 25000003 PHARM REV CODE 250: Performed by: INTERNAL MEDICINE

## 2025-05-23 PROCEDURE — 93010 ELECTROCARDIOGRAM REPORT: CPT | Mod: ,,, | Performed by: INTERNAL MEDICINE

## 2025-05-23 RX ORDER — LEVETIRACETAM 500 MG/1
500 TABLET ORAL 2 TIMES DAILY
Qty: 60 TABLET | Refills: 11 | Status: SHIPPED | OUTPATIENT
Start: 2025-05-23 | End: 2026-05-23

## 2025-05-23 RX ADMIN — LEVETIRACETAM 500 MG: 100 INJECTION, SOLUTION INTRAVENOUS at 09:05

## 2025-05-23 RX ADMIN — BUTALBITAL, ACETAMINOPHEN, AND CAFFEINE 1 TABLET: 325; 50; 40 TABLET ORAL at 10:05

## 2025-05-23 NOTE — DISCHARGE SUMMARY
[facility] Hospital Medicine Discharge Summary    Admit Date: 2025  Discharge Date and Time: 0:07 AM  Admitting Physician:  Team  Discharging Physician: Steven Cunningham MD.  Primary Care Physician: No primary care provider on file.  Consults: Neurology          Pt was seen and examined on the day of discharge  Vitals:  VITAL SIGNS: 24 HRS MIN & MAX LAST   Temp  Min: 97.4 °F (36.3 °C)  Max: 98.3 °F (36.8 °C) 97.4 °F (36.3 °C)   BP  Min: 94/59  Max: 107/70 (!) 95/54   Pulse  Min: 54  Max: 72  (!) 54   Resp  Min: 16  Max: 16 16   SpO2  Min: 97 %  Max: 98 % 98 %     General: Comfortable, not in distress  Respiratory: Clear to auscultation bilaterally, nonlabored breathing  Cardiovascular: RRR, S1, S2  Abdominal: Soft, nontender, nondistended  Neurological: AOx4, no focal deficits  Psychiatric: Cooperative        34-year-old  female with no known past medical history presenting from [facility] for new onset seizure.  Patient states she was at work when she suddenly started seizing.  CT head, MRI brain with and without contrast showed no acute changes.  Neurology recommended Keppra 500 b.i.d..  She remained seizure-free while in the hospital and will be discharged to home today with the necessary prescriptions.      New onset seizure   Lactic acidosis due to above-resolved        Procedures Performed: No admission procedures for hospital encounter.     Significant Diagnostic Studies: See Full reports for all details    Recent Labs   Lab 25  1402 25  0804 25  0540   WBC 14.97* 14.38* 12.81*   RBC 4.77 4.32 4.07*   HGB 15.2 13.4 12.3   HCT 42.6 38.7 36.7*   MCV 89.3 89.6 90.2   MCH 31.9* 31.0 30.2   MCHC 35.7 34.6 33.5   RDW 12.0 12.3 12.9    313 298   MPV 10.2 10.4 10.7*       Recent Labs   Lab 25  1402 25  0804 25  0540    140 140   K 3.5 3.4* 2.9*    107 106   CO2 23 23 26   BUN 14.1 8.1 12.2   CREATININE 0.66 0.54*  0.56   * 122* 89   CALCIUM 9.8 8.5 8.3*   MG 1.90  --  2.00   ALBUMIN 4.1  --  3.3*   PROT 8.3  --  6.5   ALKPHOS 77  --  48   ALT 19  --  14   AST 22  --  12   BILITOT 0.3  --  0.5        Microbiology Results (last 7 days)       ** No results found for the last 168 hours. **             MRI Brain With Contrast  Narrative: EXAMINATION:  MRI BRAIN WITH CONTRAST    CLINICAL HISTORY:  Seizure, new-onset, no history of trauma;epilepsy protocol;    TECHNIQUE:  Postcontrast MR images of the brain.    COMPARISON:  MRI brain dated 05/22/2025    FINDINGS:  There is no abnormal parenchymal or leptomeningeal enhancement.  Impression: No abnormal intracranial enhancement.    Electronically signed by: Terra Maynard  Date:    05/22/2025  Time:    11:57  MRI Brain Without Contrast  Narrative: EXAMINATION:  MRI BRAIN WITHOUT CONTRAST    CLINICAL HISTORY:  Seizure, new-onset, no history of trauma;epilepsy protocol;    TECHNIQUE:  Multiplanar multisequence MR imaging of the brain was performed without contrast.    COMPARISON:  CT head without contrast 05/21/2025    FINDINGS:  INTRACRANIAL: Brain parenchyma demonstrates normal signal and configuration.  No parenchymal restricted diffusion.  No evidence of intracranial hemorrhage.  No extra-axial fluid collection or mass.  No intracranial mass effect.  No hydrocephalus.  Midline structures have a normal configuration.  Visualized pituitary gland and infundibulum are normal.  Visualized major intracranial vascular structures demonstrate normal flow voids and are normal in course and caliber.    SINUSES: Paranasal sinuses and mastoid air cells are clear.    ORBITS: Visualized orbits are normal.  Impression: Unremarkable exam.    Electronically signed by: Alfonso Easley  Date:    05/22/2025  Time:    07:32         Medication List        START taking these medications      levETIRAcetam 500 MG Tab  Commonly known as: KEPPRA  Take 1 tablet (500 mg total) by mouth 2 (two) times  daily.               Where to Get Your Medications        These medications were sent to [facility] [address]  [address] [facility] [address]      Phone: [phone]   levETIRAcetam 500 MG Tab          Explained in detail to the patient about the discharge plan, medications, and follow-up visits. Pt understands and agrees with the treatment plan  Discharge Disposition: Short Term Hospital   Discharged Condition: stable  Diet-   Dietary Orders (From admission, onward)       Start     Ordered    05/22/25 0934  Diet Adult Regular Standard Tray  Diet effective now        Question:  Tray type:  Answer:  Standard Tray    05/22/25 0933                   Medications Per DC med rec  Activities as tolerated   Follow-up Information       Clinics, [facility] Amb Follow up.    Why: Internal medicine clinic will contact you to schedule an appointment.  Contact information:  [address] [place], [place] [id number] [phone]                           For further questions contact hospitalist office    Discharge time 33 minutes    For worsening symptoms, chest pain, shortness of breath, increased abdominal pain, high grade fever, stroke or stroke like symptoms, immediately go to the nearest Emergency Room or call 911 as soon as possible.      Steven Wilkes M.D, on 5/23/2025. at 10:07 AM.

## 2025-05-23 NOTE — ASSESSMENT & PLAN NOTE
EEG revealing for L temporal sharp waves  MRI brain w/o w contrast unrevealing for acute intracranial abnormalities and abnormal enhancement     -Continue keppra 500 mg BID following discharge  -referral sent to neurology at [facility]  -Seizure precautions  -okay to be discharged to home at 24 hrs seizure free from neurologic standpoint

## 2025-05-23 NOTE — PLAN OF CARE
Problem: Adult Inpatient Plan of Care  Goal: Plan of Care Review  Outcome: Progressing  Goal: Patient-Specific Goal (Individualized)  Outcome: Progressing  Goal: Absence of Hospital-Acquired Illness or Injury  Outcome: Progressing  Goal: Optimal Comfort and Wellbeing  Outcome: Progressing  Goal: Readiness for Transition of Care  Outcome: Progressing     Problem: Infection  Goal: Absence of Infection Signs and Symptoms  Outcome: Progressing     Problem: Seizure, Active Management  Goal: Absence of Seizure/Seizure-Related Injury  Outcome: Progressing

## 2025-05-23 NOTE — PLAN OF CARE
05/23/25 1204   Final Note   Assessment Type Final Discharge Note   Anticipated Discharge Disposition Home   Post-Acute Status   Discharge Delays None known at this time     Patient is discharged home to self care.  No further discharge planning needs identified at this time.

## 2025-05-23 NOTE — SUBJECTIVE & OBJECTIVE
Subjective:     Interval History:   No further seizure events overnight or this a.m. per patient and patient's mother at bedside report.  Neurologic exam remains stable.  Tolerating Keppra.    MRI brain post contrast unrevealing for abnormal enhancement.  Results d/w pt and pt's mother has bedside.    Current Neurological Medications:     Current Medications[1]    Review of Systems  A 14pt ros was reviewed & is negative unless o/w documented in the hpi    Objective:     Vital Signs (Most Recent):  Temp: 98.3 °F (36.8 °C) (05/23/25 0306)  Pulse: (!) 58 (05/23/25 0306)  Resp: 16 (05/23/25 0348)  BP: 106/60 (05/23/25 0306)  SpO2: 98 % (05/23/25 0306) Vital Signs (24h Range):  Temp:  [97.5 °F (36.4 °C)-98.3 °F (36.8 °C)] 98.3 °F (36.8 °C)  Pulse:  [57-72] 58  Resp:  [16] 16  SpO2:  [97 %-98 %] 98 %  BP: ()/(59-70) 106/60     Weight: 82.6 kg (182 lb)  Body mass index is 32.24 kg/m².     Physical Exam   GENERAL: NAD, calm, cooperative, appropriate, awake/alert  MENTAL STATUS: Oriented x4, follows commands reliably  SPEECH/LANGUAGE: Clear, coherent  CN:  EOMI gaze conjugate, visual fields intact  PERRLA  Motor: no focal motor weakness  Sensory: Normal to tactile stim/vibration  Gait: not observed         Significant Labs:   Recent Lab Results         05/23/25  0540   05/22/25  0804        Albumin/Globulin Ratio 1.0         Albumin 3.3         ALP 48         ALT 14         Anion Gap 8.0   10.0       AST 12         Baso # 0.02   0.02       Basophil % 0.2   0.1       BILIRUBIN TOTAL 0.5         BUN 12.2   8.1       BUN/CREAT RATIO 22   15       Calcium 8.3   8.5       Chloride 106   107       CO2 26   23       Creatinine 0.56   0.54       eGFR >60  Comment: Estimated GFR calculated using the CKD-EPI creatinine (2021) equation.   >60  Comment: Estimated GFR calculated using the CKD-EPI creatinine (2021) equation.       Eos # 0.01   0.00       Eos % 0.1   0.0       Estimated Avg Glucose   105.4       Globulin, Total  3.2         Glucose 89   122       Hematocrit 36.7   38.7       Hemoglobin 12.3   13.4       Hemoglobin A1C External   5.3       Immature Grans (Abs) 0.03   0.05       Immature Granulocytes 0.2   0.3       Lactic Acid Level   1.4       Lymph # 5.20   2.02       LYMPH % 40.6   14.0       Magnesium  2.00         MCH 30.2   31.0       MCHC 33.5   34.6       MCV 90.2   89.6       Mono # 0.61   0.28       Mono % 4.8   1.9       MPV 10.7   10.4       Neut # 6.94   12.01       Neut % 54.1   83.7       nRBC 0.0   0.0       Platelet Count 298   313       Potassium 2.9   3.4       PROTEIN TOTAL 6.5         RBC 4.07   4.32       RDW 12.9   12.3       Sodium 140   140       WBC 12.81   14.38               Significant Imaging:   MRI brain w:  Impression:     No abnormal intracranial enhancement.    I have reviewed all pertinent imaging results/findings within the past 24 hours.       [1]   Current Facility-Administered Medications   Medication Dose Route Frequency Provider Last Rate Last Admin    acetaminophen tablet 650 mg  650 mg Oral Q4H PRN Reyes, Thairy G, DO        albuterol-ipratropium 2.5 mg-0.5 mg/3 mL nebulizer solution 3 mL  3 mL Nebulization Q6H PRN Reyes, Thairy G, DO        aluminum-magnesium hydroxide-simethicone 200-200-20 mg/5 mL suspension 30 mL  30 mL Oral QID PRN Reyes, Thairy G, DO        bisacodyL suppository 10 mg  10 mg Rectal Daily PRN Reyes, Thairy G, DO        butalbital-acetaminophen-caffeine -40 mg per tablet 1 tablet  1 tablet Oral Q4H PRN Steven Cunningham MD   1 tablet at 05/22/25 0944    dextrose 50% injection 12.5 g  12.5 g Intravenous PRN Reyes, Thairy G, DO        dextrose 50% injection 25 g  25 g Intravenous PRN Reyes, Thairy G, DO        enoxaparin injection 40 mg  40 mg Subcutaneous Daily Reyes, Thairy G, DO   40 mg at 05/22/25 1723    glucagon (human recombinant) injection 1 mg  1 mg Intramuscular PRN Reyes, Thairy G, DO        glucose chewable tablet 16 g  16 g Oral PRN Reyes,  Gurinder ALICEA, DO        glucose chewable tablet 24 g  24 g Oral PRN Reyes, Thairy G, DO        HYDROcodone-acetaminophen 5-325 mg per tablet 1 tablet  1 tablet Oral Q6H PRN Reyes, Thairy G, DO   1 tablet at 05/21/25 2340    levETIRAcetam (Keppra) 500 mg in D5W 100 mL IVPB (MB+)  500 mg Intravenous Q12H Reyes, Thairy G, DO   Stopped at 05/22/25 2147    LORazepam injection 2 mg  2 mg Intravenous Q15 Min PRN Reyes, Thairy G, DO        melatonin tablet 9 mg  9 mg Oral Nightly PRN Reyes, Thairy G, DO        morphine injection 2 mg  2 mg Intravenous Q6H PRN Reyes, Thairy G, DO        naloxone 0.4 mg/mL injection 0.02 mg  0.02 mg Intravenous PRN Reyes, Thairy G, DO        ondansetron disintegrating tablet 8 mg  8 mg Oral Q8H PRN Reyes, Thairy G, DO        ondansetron injection 4 mg  4 mg Intravenous Q8H PRN Reyes, Thairy G, DO        polyethylene glycol packet 17 g  17 g Oral TID PRN Reyes, Thairy G, DO        simethicone chewable tablet 80 mg  1 tablet Oral QID PRN Reyes, Thairy G, DO        sodium chloride 0.9% flush 10 mL  10 mL Intravenous PRN Reyes, Thairy G, DO

## 2025-05-23 NOTE — PROGRESS NOTES
"[facility]  Neurology  Progress Note    Patient Name: Kaylin Larios  MRN: [id number]  Admission Date: 5/21/2025  Hospital Length of Stay: 2 days  Code Status: Full Code   Attending Provider: Reyes, Thairy G, DO  Primary Care Physician: No primary care provider on file.   Principal Problem:New onset seizure    HPI:   34-year-old female with no PMH who presented to [facility] ED on 5/21 following a seizure-like event.  Patient was at work when initial seizure-like activity, described as sudden LOC, fixed gaze deviation and increased rigidity, lasting approx 2 min, and followed by postictal disorientation.  Additional seizure episode occurred en route with EMS with similar presentation.  Reports "worst headache of her life" that began the day prior to the seizure events, otherwise denies focal neurologic deficits, sleep deprivation, recent illnesses, or recent medication changes.  Was started on Keppra 500 mg b.i.d. in ED.    CT head w/o and MRI brain w/o unrevealing for acute intracranial abnormalities.  Labs revealed mild leukocytosis, otherwise unremarkable.  Underwent LP in ED, preliminary CSF analysis unremarkable, and was started on prophylactic antimicrobial CNS coverage.    Of note patient is from [place], here for work, and [employer] for occupation.    History obtained from patient.  Patient is Russian speaking,  used.    Overview/Hospital Course:  No notes on file        Subjective:     Interval History:   No further seizure events overnight or this a.m. per patient and patient's mother at bedside report.  Neurologic exam remains stable.  Tolerating Keppra.    MRI brain post contrast unrevealing for abnormal enhancement.  Results d/w pt and pt's mother has bedside.    Current Neurological Medications:     Current Medications[1]    Review of Systems  A 14pt ros was reviewed & is negative unless o/w documented in the hpi    Objective:     Vital Signs (Most Recent):  Temp: 98.3 °F " (36.8 °C) (05/23/25 0306)  Pulse: (!) 58 (05/23/25 0306)  Resp: 16 (05/23/25 0348)  BP: 106/60 (05/23/25 0306)  SpO2: 98 % (05/23/25 0306) Vital Signs (24h Range):  Temp:  [97.5 °F (36.4 °C)-98.3 °F (36.8 °C)] 98.3 °F (36.8 °C)  Pulse:  [57-72] 58  Resp:  [16] 16  SpO2:  [97 %-98 %] 98 %  BP: ()/(59-70) 106/60     Weight: 82.6 kg (182 lb)  Body mass index is 32.24 kg/m².     Physical Exam   GENERAL: NAD, calm, cooperative, appropriate, awake/alert  MENTAL STATUS: Oriented x4, follows commands reliably  SPEECH/LANGUAGE: Clear, coherent  CN:  EOMI gaze conjugate, visual fields intact  PERRLA  Motor: no focal motor weakness  Sensory: Normal to tactile stim/vibration  Gait: not observed         Significant Labs:   Recent Lab Results         05/23/25  0540   05/22/25  0804        Albumin/Globulin Ratio 1.0         Albumin 3.3         ALP 48         ALT 14         Anion Gap 8.0   10.0       AST 12         Baso # 0.02   0.02       Basophil % 0.2   0.1       BILIRUBIN TOTAL 0.5         BUN 12.2   8.1       BUN/CREAT RATIO 22   15       Calcium 8.3   8.5       Chloride 106   107       CO2 26   23       Creatinine 0.56   0.54       eGFR >60  Comment: Estimated GFR calculated using the CKD-EPI creatinine (2021) equation.   >60  Comment: Estimated GFR calculated using the CKD-EPI creatinine (2021) equation.       Eos # 0.01   0.00       Eos % 0.1   0.0       Estimated Avg Glucose   105.4       Globulin, Total 3.2         Glucose 89   122       Hematocrit 36.7   38.7       Hemoglobin 12.3   13.4       Hemoglobin A1C External   5.3       Immature Grans (Abs) 0.03   0.05       Immature Granulocytes 0.2   0.3       Lactic Acid Level   1.4       Lymph # 5.20   2.02       LYMPH % 40.6   14.0       Magnesium  2.00         MCH 30.2   31.0       MCHC 33.5   34.6       MCV 90.2   89.6       Mono # 0.61   0.28       Mono % 4.8   1.9       MPV 10.7   10.4       Neut # 6.94   12.01       Neut % 54.1   83.7       nRBC 0.0   0.0        Platelet Count 298   313       Potassium 2.9   3.4       PROTEIN TOTAL 6.5         RBC 4.07   4.32       RDW 12.9   12.3       Sodium 140   140       WBC 12.81   14.38               Significant Imaging:   MRI brain w:  Impression:     No abnormal intracranial enhancement.    I have reviewed all pertinent imaging results/findings within the past 24 hours.    Assessment and Plan:     * New onset seizure  EEG revealing for L temporal sharp waves  MRI brain w/o w contrast unrevealing for acute intracranial abnormalities and abnormal enhancement     -Continue keppra 500 mg BID following discharge  -referral sent to neurology at [facility]  -Seizure precautions  -okay to be discharged to home at 24 hrs seizure free from neurologic standpoint            VTE Risk Mitigation (From admission, onward)           Ordered     enoxaparin injection 40 mg  Daily         05/21/25 2142     IP VTE HIGH RISK PATIENT  Once         05/21/25 2142     Place sequential compression device  Until discontinued         05/21/25 2142                    JEFF Cali-BC  Neurology  [facility] - Neurology       [1]   Current Facility-Administered Medications   Medication Dose Route Frequency Provider Last Rate Last Admin    acetaminophen tablet 650 mg  650 mg Oral Q4H PRN Reyes, Thairy G, DO        albuterol-ipratropium 2.5 mg-0.5 mg/3 mL nebulizer solution 3 mL  3 mL Nebulization Q6H PRN Reyes, Thairy G, DO        aluminum-magnesium hydroxide-simethicone 200-200-20 mg/5 mL suspension 30 mL  30 mL Oral QID PRN Reyes, Thairy G, DO        bisacodyL suppository 10 mg  10 mg Rectal Daily PRN Reyes, Thairy G, DO        butalbital-acetaminophen-caffeine -40 mg per tablet 1 tablet  1 tablet Oral Q4H PRN Steven Cunningham MD   1 tablet at 05/22/25 0944    dextrose 50% injection 12.5 g  12.5 g Intravenous PRN Reyes, Thairy G, DO        dextrose 50% injection 25 g  25 g Intravenous PRN Reyes, Thairy G, DO        enoxaparin injection 40  mg  40 mg Subcutaneous Daily Reyes, Thairy G, DO   40 mg at 05/22/25 1723    glucagon (human recombinant) injection 1 mg  1 mg Intramuscular PRN Reyes, Thairy G, DO        glucose chewable tablet 16 g  16 g Oral PRN Reyes, Thairy G, DO        glucose chewable tablet 24 g  24 g Oral PRN Reyes, Thairy G, DO        HYDROcodone-acetaminophen 5-325 mg per tablet 1 tablet  1 tablet Oral Q6H PRN Reyes, Thairy G, DO   1 tablet at 05/21/25 2340    levETIRAcetam (Keppra) 500 mg in D5W 100 mL IVPB (MB+)  500 mg Intravenous Q12H Reyes, Thairy G, DO   Stopped at 05/22/25 2147    LORazepam injection 2 mg  2 mg Intravenous Q15 Min PRN Reyes, Thairy G, DO        melatonin tablet 9 mg  9 mg Oral Nightly PRN Reyes, Thairy G, DO        morphine injection 2 mg  2 mg Intravenous Q6H PRN Reyes, Thairy G, DO        naloxone 0.4 mg/mL injection 0.02 mg  0.02 mg Intravenous PRN Reyes, Thairy G, DO        ondansetron disintegrating tablet 8 mg  8 mg Oral Q8H PRN Reyes, Thairy G, DO        ondansetron injection 4 mg  4 mg Intravenous Q8H PRN Reyes, Thairy G, DO        polyethylene glycol packet 17 g  17 g Oral TID PRN Reyes, Thairy G, DO        simethicone chewable tablet 80 mg  1 tablet Oral QID PRN Reyes, Thairy G, DO        sodium chloride 0.9% flush 10 mL  10 mL Intravenous PRN Reyes, Thairy G, DO

## 2025-05-26 LAB
BACTERIA BLD CULT: NORMAL
BACTERIA BLD CULT: NORMAL
BACTERIA FLD CULT: NORMAL

## 2025-05-29 ENCOUNTER — OFFICE VISIT (OUTPATIENT)
Dept: INTERNAL MEDICINE | Facility: CLINIC | Age: 35
End: 2025-05-29

## 2025-05-29 VITALS
OXYGEN SATURATION: 98 % | BODY MASS INDEX: 39.09 KG/M2 | SYSTOLIC BLOOD PRESSURE: 118 MMHG | TEMPERATURE: 99 F | RESPIRATION RATE: 17 BRPM | DIASTOLIC BLOOD PRESSURE: 79 MMHG | WEIGHT: 220.63 LBS | HEIGHT: 63 IN | HEART RATE: 60 BPM

## 2025-05-29 DIAGNOSIS — E66.812 CLASS 2 OBESITY DUE TO EXCESS CALORIES WITHOUT SERIOUS COMORBIDITY WITH BODY MASS INDEX (BMI) OF 39.0 TO 39.9 IN ADULT: Primary | ICD-10-CM

## 2025-05-29 DIAGNOSIS — R56.9 NEW ONSET SEIZURE: ICD-10-CM

## 2025-05-29 DIAGNOSIS — E66.09 CLASS 2 OBESITY DUE TO EXCESS CALORIES WITHOUT SERIOUS COMORBIDITY WITH BODY MASS INDEX (BMI) OF 39.0 TO 39.9 IN ADULT: Primary | ICD-10-CM

## 2025-05-29 PROCEDURE — 99215 OFFICE O/P EST HI 40 MIN: CPT | Mod: PBBFAC

## 2025-05-29 NOTE — ASSESSMENT & PLAN NOTE
Body mass index is 39.08 kg/m².  Goal BMI <30.  Aerobic exercise 150 minutes per week.  Avoid soda, simple sugars, sweets, excessive rice, pasta, potatoes or bread.   Choose brown options when available and portion control.  Limit fast foods and fried foods.   Choose complex carbs in moderation (ex: green, leafy vegetables, beans, oatmeal).  Eat plenty of fresh fruits and vegetables with lean meats daily.   Consider permanent healthy lifestyle changes.

## 2025-05-29 NOTE — PROGRESS NOTES
PATIENT NAME: Kaylin Larios  : 1990  DATE: 25  MRN: [id number]          Reason for Visit/Chief Complaint   Hospital Follow Up (seizures)       History of Present Illness (HPI)     Kaylin Larios is a 34 y.o.  female presenting in clinic today for a Hospital Follow Up (seizures). PMH: seizure. PCP: in [place]. : [id number] Alex (was disconnected), and reconnected with [id number] Clementina.    On 2025, patient had an ED visit for new onset seizure. She was working and started to seize. CT head, MRI brain with and without contrast showed no acute changes. Neurology recommended Keppra 500 BID. Today, patients in clinic alongside her supervisor Ms. Carrasquillo. She denies having a seizure since discharge. She endorses taking keppra as prescribed. She only endorses intermittent headaches, but denies current headache. She is concerned about seizure precautions.     Patient is in the [place] on a work visa that ends on 2025 - she is then going back to [place].    Review of Systems     Review of Systems   Constitutional: Negative.    HENT: Negative.     Eyes: Negative.    Respiratory: Negative.     Cardiovascular: Negative.    Gastrointestinal: Negative.    Endocrine: Negative.    Genitourinary: Negative.    Musculoskeletal: Negative.    Skin: Negative.    Allergic/Immunologic: Negative.    Neurological: Negative.    Hematological: Negative.    Psychiatric/Behavioral: Negative.     All other systems reviewed and are negative.      Medical / Social / Family History     Past Medical History:   Diagnosis Date    Seizures          Past Surgical History:   Procedure Laterality Date     SECTION           Social History  Kaylin Larios's  reports that she has never smoked. She has never used smokeless tobacco. She reports that she does not drink alcohol and does not use drugs.    Family History  Kaylin Larios's family history is not on file.    Medications and Allergies     Medications  Current Outpatient  "Medications   Medication Instructions    levETIRAcetam (KEPPRA) 500 mg, Oral, 2 times daily       Allergies  Review of patient's allergies indicates:  No Known Allergies    Physical Examination   Visit Vitals  /79 (BP Location: Right arm, Patient Position: Sitting)   Pulse 60   Temp 98.5 °F (36.9 °C) (Oral)   Resp 17   Ht 5' 3" (1.6 m)   Wt 100.1 kg (220 lb 9.6 oz)   SpO2 98%   BMI 39.08 kg/m²     Physical Exam  Vitals reviewed.   Constitutional:       Appearance: Normal appearance. She is obese.   HENT:      Head: Normocephalic and atraumatic.      Right Ear: External ear normal.      Left Ear: External ear normal.      Nose: Nose normal.      Mouth/Throat:      Mouth: Mucous membranes are moist.      Pharynx: Oropharynx is clear.   Eyes:      Extraocular Movements: Extraocular movements intact.      Conjunctiva/sclera: Conjunctivae normal.      Pupils: Pupils are equal, round, and reactive to light.   Cardiovascular:      Rate and Rhythm: Normal rate and regular rhythm.      Pulses: Normal pulses.      Heart sounds: Normal heart sounds.   Pulmonary:      Effort: Pulmonary effort is normal.      Breath sounds: Normal breath sounds.   Abdominal:      General: Bowel sounds are normal.      Palpations: Abdomen is soft.   Musculoskeletal:         General: Normal range of motion.      Cervical back: Normal range of motion and neck supple.   Skin:     General: Skin is warm and dry.      Capillary Refill: Capillary refill takes less than 2 seconds.   Neurological:      General: No focal deficit present.      Mental Status: She is alert and oriented to person, place, and time.   Psychiatric:         Mood and Affect: Mood normal.         Behavior: Behavior normal.         Thought Content: Thought content normal.         Judgment: Judgment normal.           Results     Lab Results   Component Value Date    WBC 12.81 (H) 05/23/2025    RBC 4.07 (L) 05/23/2025    HGB 12.3 05/23/2025    HCT 36.7 (L) 05/23/2025    MCV 90.2 " "05/23/2025    MCH 30.2 05/23/2025    MCHC 33.5 05/23/2025    RDW 12.9 05/23/2025     05/23/2025    MPV 10.7 (H) 05/23/2025      Lab Results   Component Value Date     05/23/2025    K 2.9 (L) 05/23/2025    CO2 26 05/23/2025    BUN 12.2 05/23/2025    CREATININE 0.56 05/23/2025    ALBUMIN 3.3 (L) 05/23/2025    BILITOT 0.5 05/23/2025    ALKPHOS 48 05/23/2025    AST 12 05/23/2025    ALT 14 05/23/2025    AGAP 8.0 05/23/2025    EGFRNORACEVR >60 05/23/2025     No results found for: "TSH", "T4FREE"  No results found for: "CHOL", "HDL", "LDL", "TRIG"  Lab Results   Component Value Date    SGUA >=1.030 05/21/2025    PHURINE 5.5 05/21/2025    PROTEINUA 100 (A) 05/21/2025    BILIRUBINUA Negative 05/21/2025    WBCUA 0-5 05/21/2025    RBCUA None Seen 05/21/2025    BACTERIA Few (A) 05/21/2025    NITRITESUA Negative 04/01/2023    LEUKOCYTESUR Negative 05/21/2025    UROBILINOGEN 0.2 05/21/2025     No results found for: "CREATRANDUR", "MICALBCREAT"  No results found for: "AWCVMZPK88IX", "V12", "FOLATE"  Lab Results   Component Value Date    HIV Nonreactive 05/31/2017    HEPBSAG Negative 05/31/2017    HEPCAB Nonreactive 05/31/2017     No results found for: "FITDIAG", "COLOGUARD"  No results found for: "OCCBLDIA"    Assessment and Plan (including Health Maintenance)     Problem List Items Addressed This Visit       New onset seizure    Current Assessment & Plan   Continue keppra as prescribed.  When you return to [place], please inform PCP of seizures, follow up with Neuro.  Take your medicines exactly as prescribed.  · Do not drive a car, operate machinery, swim, climb ladders, work on elevated surfaces (heights), take a bath (shower instead), or do any other activity that could be dangerous to you or others if seizure within past 6 months.  · Be sure that anyone treating you for any health problem knows that you have had a seizure and what medicines you are taking for it.  · Identify and avoid things that may make you more " likely to have a seizure, such as lack of sleep, alcohol or drug use, stress, or not eating.  Report to the nearest ED for seizure activity.           Class 2 obesity due to excess calories without serious comorbidity with body mass index (BMI) of 39.0 to 39.9 in adult - Primary    Current Assessment & Plan   Body mass index is 39.08 kg/m².  Goal BMI <30.  Aerobic exercise 150 minutes per week.  Avoid soda, simple sugars, sweets, excessive rice, pasta, potatoes or bread.   Choose brown options when available and portion control.  Limit fast foods and fried foods.   Choose complex carbs in moderation (ex: green, leafy vegetables, beans, oatmeal).  Eat plenty of fresh fruits and vegetables with lean meats daily.   Consider permanent healthy lifestyle changes.              Health Maintenance Due   Topic Date Due    Cervical Cancer Screening  Never done    Lipid Panel  Never done    COVID-19 Vaccine (2 - 2024-25 season) 09/01/2024     Tests to Keep You Healthy    Cervical Cancer Screening: DUE      Health Maintenance Topics with due status: Not Due       Topic Last Completion Date    Influenza Vaccine Not Due    RSV Vaccine (Age 60+ and Pregnant patients) Not Due     Follow up No follow up needed - patient is going back to [place].  Provided patient with office information in case she needs anything prior to departing back to her home in [place].    Signature:        ALANIS Franco  [facility]  [address] [id number]    Date of encounter: 5/29/25

## 2025-05-29 NOTE — LETTER
May 29, 2025      [facility]  [facility] [facility] [address]  Phone: [phone]  Fax: [phone]       Patient: Kaylin Larios   YOB: 1990  Date of Visit: 05/29/2025    To Whom It May Concern:    Kaylin Larios  was at [facility] on 05/29/2025. She may return to work/school on 5/30/2025 with no restrictions, but if you are feeling tired, please let your supervisor know. If you have any questions or concerns, or if I can be of further assistance, please do not hesitate to contact me.    Sincerely,        ALANIS Franco

## 2025-05-29 NOTE — ASSESSMENT & PLAN NOTE
Continue keppra as prescribed.  When you return to [place], please inform PCP of seizures, follow up with Neuro.  Take your medicines exactly as prescribed.  · Do not drive a car, operate machinery, swim, climb ladders, work on elevated surfaces (heights), take a bath (shower instead), or do any other activity that could be dangerous to you or others if seizure within past 6 months.  · Be sure that anyone treating you for any health problem knows that you have had a seizure and what medicines you are taking for it.  · Identify and avoid things that may make you more likely to have a seizure, such as lack of sleep, alcohol or drug use, stress, or not eating.  Report to the nearest ED for seizure activity.